# Patient Record
Sex: MALE | Race: WHITE | Employment: FULL TIME | URBAN - METROPOLITAN AREA
[De-identification: names, ages, dates, MRNs, and addresses within clinical notes are randomized per-mention and may not be internally consistent; named-entity substitution may affect disease eponyms.]

---

## 2022-04-14 ENCOUNTER — HOSPITAL ENCOUNTER (OUTPATIENT)
Dept: ULTRASOUND IMAGING | Age: 47
Discharge: HOME | End: 2022-04-16
Payer: COMMERCIAL

## 2022-04-14 DIAGNOSIS — R94.4 ABNORMAL KIDNEY FUNCTION STUDY: ICD-10-CM

## 2022-04-14 PROCEDURE — 76770 US EXAM ABDO BACK WALL COMP: CPT

## 2022-04-14 PROCEDURE — 93975 VASCULAR STUDY: CPT

## 2022-09-19 ENCOUNTER — HOSPITAL ENCOUNTER (OUTPATIENT)
Age: 47
Setting detail: OBSERVATION
Discharge: HOME | End: 2022-09-21
Attending: EMERGENCY MEDICINE
Payer: COMMERCIAL

## 2022-09-19 ENCOUNTER — APPOINTMENT (OUTPATIENT)
Dept: CT IMAGING | Age: 47
End: 2022-09-19
Payer: COMMERCIAL

## 2022-09-19 DIAGNOSIS — N20.0 KIDNEY STONE: ICD-10-CM

## 2022-09-19 DIAGNOSIS — N17.9 AKI (ACUTE KIDNEY INJURY) (HCC): Primary | ICD-10-CM

## 2022-09-19 DIAGNOSIS — N20.1 URETERAL CALCULI: ICD-10-CM

## 2022-09-19 PROBLEM — E11.9 DIABETES MELLITUS (HCC): Status: ACTIVE | Noted: 2022-09-19

## 2022-09-19 PROBLEM — E78.5 HLD (HYPERLIPIDEMIA): Status: ACTIVE | Noted: 2022-09-19

## 2022-09-19 PROBLEM — N13.2 HYDRONEPHROSIS WITH RENAL CALCULOUS OBSTRUCTION: Status: ACTIVE | Noted: 2022-09-19

## 2022-09-19 LAB
ANION GAP SERPL CALCULATED.3IONS-SCNC: 14 MMOL/L (ref 7–16)
BACTERIA: NORMAL /HPF
BASOPHILS ABSOLUTE: 0.04 E9/L (ref 0–0.2)
BASOPHILS RELATIVE PERCENT: 0.4 % (ref 0–2)
BILIRUBIN URINE: NEGATIVE
BLOOD, URINE: ABNORMAL
BUN BLDV-MCNC: 40 MG/DL (ref 6–20)
CALCIUM SERPL-MCNC: 10 MG/DL (ref 8.6–10.2)
CHLORIDE BLD-SCNC: 101 MMOL/L (ref 98–107)
CLARITY: ABNORMAL
CO2: 24 MMOL/L (ref 22–29)
COLOR: YELLOW
CREAT SERPL-MCNC: 3.1 MG/DL (ref 0.7–1.2)
EOSINOPHILS ABSOLUTE: 0.08 E9/L (ref 0.05–0.5)
EOSINOPHILS RELATIVE PERCENT: 0.9 % (ref 0–6)
EPITHELIAL CELLS, UA: NORMAL /HPF
GFR AFRICAN AMERICAN: 26
GFR NON-AFRICAN AMERICAN: 22 ML/MIN/1.73
GLUCOSE BLD-MCNC: 113 MG/DL (ref 74–99)
GLUCOSE URINE: >=1000 MG/DL
HCT VFR BLD CALC: 38.7 % (ref 37–54)
HEMOGLOBIN: 13.2 G/DL (ref 12.5–16.5)
IMMATURE GRANULOCYTES #: 0.03 E9/L
IMMATURE GRANULOCYTES %: 0.3 % (ref 0–5)
KETONES, URINE: 15 MG/DL
LEUKOCYTE ESTERASE, URINE: NEGATIVE
LYMPHOCYTES ABSOLUTE: 2.5 E9/L (ref 1.5–4)
LYMPHOCYTES RELATIVE PERCENT: 27.1 % (ref 20–42)
MCH RBC QN AUTO: 30.6 PG (ref 26–35)
MCHC RBC AUTO-ENTMCNC: 34.1 % (ref 32–34.5)
MCV RBC AUTO: 89.8 FL (ref 80–99.9)
MONOCYTES ABSOLUTE: 0.73 E9/L (ref 0.1–0.95)
MONOCYTES RELATIVE PERCENT: 7.9 % (ref 2–12)
NEUTROPHILS ABSOLUTE: 5.83 E9/L (ref 1.8–7.3)
NEUTROPHILS RELATIVE PERCENT: 63.4 % (ref 43–80)
NITRITE, URINE: NEGATIVE
PDW BLD-RTO: 12.4 FL (ref 11.5–15)
PH UA: 6 (ref 5–9)
PLATELET # BLD: 248 E9/L (ref 130–450)
PMV BLD AUTO: 9 FL (ref 7–12)
POTASSIUM SERPL-SCNC: 4.3 MMOL/L (ref 3.5–5)
PROTEIN UA: 30 MG/DL
RBC # BLD: 4.31 E12/L (ref 3.8–5.8)
RBC UA: >20 /HPF (ref 0–2)
SODIUM BLD-SCNC: 139 MMOL/L (ref 132–146)
SPECIFIC GRAVITY UA: 1.01 (ref 1–1.03)
UROBILINOGEN, URINE: 0.2 E.U./DL
WBC # BLD: 9.2 E9/L (ref 4.5–11.5)
WBC UA: NORMAL /HPF (ref 0–5)

## 2022-09-19 PROCEDURE — 87088 URINE BACTERIA CULTURE: CPT

## 2022-09-19 PROCEDURE — 74176 CT ABD & PELVIS W/O CONTRAST: CPT

## 2022-09-19 PROCEDURE — 96360 HYDRATION IV INFUSION INIT: CPT

## 2022-09-19 PROCEDURE — 80048 BASIC METABOLIC PNL TOTAL CA: CPT

## 2022-09-19 PROCEDURE — 2580000003 HC RX 258: Performed by: PHYSICIAN ASSISTANT

## 2022-09-19 PROCEDURE — 81001 URINALYSIS AUTO W/SCOPE: CPT

## 2022-09-19 PROCEDURE — G0378 HOSPITAL OBSERVATION PER HR: HCPCS

## 2022-09-19 PROCEDURE — 99285 EMERGENCY DEPT VISIT HI MDM: CPT

## 2022-09-19 PROCEDURE — 85025 COMPLETE CBC W/AUTO DIFF WBC: CPT

## 2022-09-19 PROCEDURE — 96361 HYDRATE IV INFUSION ADD-ON: CPT

## 2022-09-19 RX ORDER — SODIUM CHLORIDE 9 MG/ML
500 INJECTION, SOLUTION INTRAVENOUS CONTINUOUS
Status: DISCONTINUED | OUTPATIENT
Start: 2022-09-19 | End: 2022-09-21 | Stop reason: HOSPADM

## 2022-09-19 RX ORDER — 0.9 % SODIUM CHLORIDE 0.9 %
500 INTRAVENOUS SOLUTION INTRAVENOUS ONCE
Status: COMPLETED | OUTPATIENT
Start: 2022-09-19 | End: 2022-09-19

## 2022-09-19 RX ADMIN — SODIUM CHLORIDE 500 ML: 9 INJECTION, SOLUTION INTRAVENOUS at 20:53

## 2022-09-19 NOTE — ED NOTES
Department of Emergency Medicine  FIRST PROVIDER TRIAGE NOTE             Independent MLP           9/19/22  3:15 PM EDT    Date of Encounter: 9/19/22   MRN: [id number]      HPI: Danna Beach is a 52 y.o. male who presents to the ED for Hematuria (Started this am)       ROS: Negative for cp, sob, or back pain. PE: Gen Appearance/Constitutional: alert  HEENT: NC/NT. PERRLA,  Airway patent. Initial Plan of Care: All treatment areas with department are currently occupied. Plan to order/Initiate the following while awaiting opening in ED: labs.   Initiate Treatment-Testing, Proceed toTreatment Area When Bed Available for ED Attending/MLP to Continue Care    Electronically signed by SAMANTHA Altamirano   DD: 9/19/22       SAMANTHA Singh  09/19/22 [id number]

## 2022-09-20 ENCOUNTER — APPOINTMENT (OUTPATIENT)
Dept: GENERAL RADIOLOGY | Age: 47
End: 2022-09-20
Payer: COMMERCIAL

## 2022-09-20 ENCOUNTER — ANESTHESIA EVENT (OUTPATIENT)
Dept: OPERATING ROOM | Age: 47
End: 2022-09-20
Payer: COMMERCIAL

## 2022-09-20 ENCOUNTER — ANESTHESIA (OUTPATIENT)
Dept: OPERATING ROOM | Age: 47
End: 2022-09-20
Payer: COMMERCIAL

## 2022-09-20 LAB
ALBUMIN SERPL-MCNC: 3.9 G/DL (ref 3.5–5.2)
ALP BLD-CCNC: 50 U/L (ref 40–129)
ALT SERPL-CCNC: 14 U/L (ref 0–40)
ANION GAP SERPL CALCULATED.3IONS-SCNC: 11 MMOL/L (ref 7–16)
AST SERPL-CCNC: 21 U/L (ref 0–39)
BASOPHILS ABSOLUTE: 0.05 E9/L (ref 0–0.2)
BASOPHILS RELATIVE PERCENT: 0.7 % (ref 0–2)
BILIRUB SERPL-MCNC: 0.4 MG/DL (ref 0–1.2)
BUN BLDV-MCNC: 36 MG/DL (ref 6–20)
CALCIUM SERPL-MCNC: 9.4 MG/DL (ref 8.6–10.2)
CHLORIDE BLD-SCNC: 106 MMOL/L (ref 98–107)
CO2: 23 MMOL/L (ref 22–29)
CREAT SERPL-MCNC: 2.8 MG/DL (ref 0.7–1.2)
EOSINOPHILS ABSOLUTE: 0.12 E9/L (ref 0.05–0.5)
EOSINOPHILS RELATIVE PERCENT: 1.6 % (ref 0–6)
GFR AFRICAN AMERICAN: 30
GFR NON-AFRICAN AMERICAN: 24 ML/MIN/1.73
GLUCOSE BLD-MCNC: 63 MG/DL (ref 74–99)
HCT VFR BLD CALC: 37.9 % (ref 37–54)
HEMOGLOBIN: 12.9 G/DL (ref 12.5–16.5)
IMMATURE GRANULOCYTES #: 0.02 E9/L
IMMATURE GRANULOCYTES %: 0.3 % (ref 0–5)
LYMPHOCYTES ABSOLUTE: 2.26 E9/L (ref 1.5–4)
LYMPHOCYTES RELATIVE PERCENT: 30.3 % (ref 20–42)
MCH RBC QN AUTO: 30.8 PG (ref 26–35)
MCHC RBC AUTO-ENTMCNC: 34 % (ref 32–34.5)
MCV RBC AUTO: 90.5 FL (ref 80–99.9)
METER GLUCOSE: 162 MG/DL (ref 74–99)
METER GLUCOSE: 65 MG/DL (ref 74–99)
METER GLUCOSE: 67 MG/DL (ref 74–99)
METER GLUCOSE: 79 MG/DL (ref 74–99)
METER GLUCOSE: 85 MG/DL (ref 74–99)
METER GLUCOSE: 99 MG/DL (ref 74–99)
MONOCYTES ABSOLUTE: 0.69 E9/L (ref 0.1–0.95)
MONOCYTES RELATIVE PERCENT: 9.3 % (ref 2–12)
NEUTROPHILS ABSOLUTE: 4.31 E9/L (ref 1.8–7.3)
NEUTROPHILS RELATIVE PERCENT: 57.8 % (ref 43–80)
PDW BLD-RTO: 12.5 FL (ref 11.5–15)
PLATELET # BLD: 229 E9/L (ref 130–450)
PMV BLD AUTO: 9.3 FL (ref 7–12)
POTASSIUM REFLEX MAGNESIUM: 4.4 MMOL/L (ref 3.5–5)
RBC # BLD: 4.19 E12/L (ref 3.8–5.8)
SODIUM BLD-SCNC: 140 MMOL/L (ref 132–146)
TOTAL PROTEIN: 6.9 G/DL (ref 6.4–8.3)
WBC # BLD: 7.5 E9/L (ref 4.5–11.5)

## 2022-09-20 PROCEDURE — 6370000000 HC RX 637 (ALT 250 FOR IP): Performed by: NURSE PRACTITIONER

## 2022-09-20 PROCEDURE — 6360000002 HC RX W HCPCS: Performed by: NURSE PRACTITIONER

## 2022-09-20 PROCEDURE — 36415 COLL VENOUS BLD VENIPUNCTURE: CPT

## 2022-09-20 PROCEDURE — 2709999900 HC NON-CHARGEABLE SUPPLY: Performed by: UROLOGY

## 2022-09-20 PROCEDURE — G0378 HOSPITAL OBSERVATION PER HR: HCPCS

## 2022-09-20 PROCEDURE — C1758 CATHETER, URETERAL: HCPCS | Performed by: UROLOGY

## 2022-09-20 PROCEDURE — 3700000001 HC ADD 15 MINUTES (ANESTHESIA): Performed by: UROLOGY

## 2022-09-20 PROCEDURE — 82962 GLUCOSE BLOOD TEST: CPT

## 2022-09-20 PROCEDURE — 3600000003 HC SURGERY LEVEL 3 BASE: Performed by: UROLOGY

## 2022-09-20 PROCEDURE — 2580000003 HC RX 258: Performed by: NURSE PRACTITIONER

## 2022-09-20 PROCEDURE — 2500000003 HC RX 250 WO HCPCS: Performed by: NURSE ANESTHETIST, CERTIFIED REGISTERED

## 2022-09-20 PROCEDURE — 6360000002 HC RX W HCPCS: Performed by: NURSE ANESTHETIST, CERTIFIED REGISTERED

## 2022-09-20 PROCEDURE — 85025 COMPLETE CBC W/AUTO DIFF WBC: CPT

## 2022-09-20 PROCEDURE — 80053 COMPREHEN METABOLIC PANEL: CPT

## 2022-09-20 PROCEDURE — 6370000000 HC RX 637 (ALT 250 FOR IP): Performed by: UROLOGY

## 2022-09-20 PROCEDURE — 3700000000 HC ANESTHESIA ATTENDED CARE: Performed by: UROLOGY

## 2022-09-20 PROCEDURE — 96361 HYDRATE IV INFUSION ADD-ON: CPT

## 2022-09-20 PROCEDURE — 74420 UROGRAPHY RTRGR +-KUB: CPT

## 2022-09-20 PROCEDURE — 7100000011 HC PHASE II RECOVERY - ADDTL 15 MIN: Performed by: UROLOGY

## 2022-09-20 PROCEDURE — C1769 GUIDE WIRE: HCPCS | Performed by: UROLOGY

## 2022-09-20 PROCEDURE — 7100000010 HC PHASE II RECOVERY - FIRST 15 MIN: Performed by: UROLOGY

## 2022-09-20 PROCEDURE — C2617 STENT, NON-COR, TEM W/O DEL: HCPCS | Performed by: UROLOGY

## 2022-09-20 PROCEDURE — 6360000004 HC RX CONTRAST MEDICATION: Performed by: UROLOGY

## 2022-09-20 PROCEDURE — 87088 URINE BACTERIA CULTURE: CPT

## 2022-09-20 PROCEDURE — 2580000003 HC RX 258: Performed by: UROLOGY

## 2022-09-20 PROCEDURE — 3600000013 HC SURGERY LEVEL 3 ADDTL 15MIN: Performed by: UROLOGY

## 2022-09-20 PROCEDURE — 2580000003 HC RX 258: Performed by: EMERGENCY MEDICINE

## 2022-09-20 DEVICE — URETERAL STENT
Type: IMPLANTABLE DEVICE | Site: URETER | Status: FUNCTIONAL
Brand: PERCUFLEX™

## 2022-09-20 RX ORDER — PRAVASTATIN SODIUM 20 MG
20 TABLET ORAL NIGHTLY
Status: DISCONTINUED | OUTPATIENT
Start: 2022-09-20 | End: 2022-09-21 | Stop reason: HOSPADM

## 2022-09-20 RX ORDER — SODIUM CHLORIDE, SODIUM LACTATE, POTASSIUM CHLORIDE, CALCIUM CHLORIDE 600; 310; 30; 20 MG/100ML; MG/100ML; MG/100ML; MG/100ML
INJECTION, SOLUTION INTRAVENOUS CONTINUOUS
Status: DISCONTINUED | OUTPATIENT
Start: 2022-09-20 | End: 2022-09-21 | Stop reason: HOSPADM

## 2022-09-20 RX ORDER — PROPOFOL 10 MG/ML
INJECTION, EMULSION INTRAVENOUS PRN
Status: DISCONTINUED | OUTPATIENT
Start: 2022-09-20 | End: 2022-09-20 | Stop reason: SDUPTHER

## 2022-09-20 RX ORDER — INSULIN GLARGINE 100 [IU]/ML
30 INJECTION, SOLUTION SUBCUTANEOUS NIGHTLY
Status: DISCONTINUED | OUTPATIENT
Start: 2022-09-20 | End: 2022-09-21 | Stop reason: HOSPADM

## 2022-09-20 RX ORDER — MORPHINE SULFATE 2 MG/ML
1 INJECTION, SOLUTION INTRAMUSCULAR; INTRAVENOUS EVERY 6 HOURS PRN
Status: DISCONTINUED | OUTPATIENT
Start: 2022-09-20 | End: 2022-09-21 | Stop reason: HOSPADM

## 2022-09-20 RX ORDER — METOPROLOL SUCCINATE 50 MG/1
50 TABLET, EXTENDED RELEASE ORAL DAILY
COMMUNITY

## 2022-09-20 RX ORDER — ROSUVASTATIN CALCIUM 10 MG/1
10 TABLET, COATED ORAL DAILY
COMMUNITY

## 2022-09-20 RX ORDER — INSULIN LISPRO 100 [IU]/ML
0-4 INJECTION, SOLUTION INTRAVENOUS; SUBCUTANEOUS
Status: DISCONTINUED | OUTPATIENT
Start: 2022-09-20 | End: 2022-09-21 | Stop reason: HOSPADM

## 2022-09-20 RX ORDER — MORPHINE SULFATE 2 MG/ML
2 INJECTION, SOLUTION INTRAMUSCULAR; INTRAVENOUS EVERY 6 HOURS PRN
Status: DISCONTINUED | OUTPATIENT
Start: 2022-09-20 | End: 2022-09-21 | Stop reason: HOSPADM

## 2022-09-20 RX ORDER — DEXTROSE MONOHYDRATE 100 MG/ML
INJECTION, SOLUTION INTRAVENOUS CONTINUOUS PRN
Status: DISCONTINUED | OUTPATIENT
Start: 2022-09-20 | End: 2022-09-21 | Stop reason: HOSPADM

## 2022-09-20 RX ORDER — ESMOLOL HYDROCHLORIDE 10 MG/ML
INJECTION INTRAVENOUS PRN
Status: DISCONTINUED | OUTPATIENT
Start: 2022-09-20 | End: 2022-09-20 | Stop reason: SDUPTHER

## 2022-09-20 RX ORDER — SODIUM CHLORIDE 9 MG/ML
INJECTION, SOLUTION INTRAVENOUS PRN
Status: DISCONTINUED | OUTPATIENT
Start: 2022-09-20 | End: 2022-09-21 | Stop reason: HOSPADM

## 2022-09-20 RX ORDER — SODIUM CHLORIDE 0.9 % (FLUSH) 0.9 %
5-40 SYRINGE (ML) INJECTION PRN
Status: DISCONTINUED | OUTPATIENT
Start: 2022-09-20 | End: 2022-09-21 | Stop reason: HOSPADM

## 2022-09-20 RX ORDER — MIDAZOLAM HYDROCHLORIDE 1 MG/ML
INJECTION INTRAMUSCULAR; INTRAVENOUS PRN
Status: DISCONTINUED | OUTPATIENT
Start: 2022-09-20 | End: 2022-09-20 | Stop reason: SDUPTHER

## 2022-09-20 RX ORDER — FENTANYL CITRATE 50 UG/ML
INJECTION, SOLUTION INTRAMUSCULAR; INTRAVENOUS PRN
Status: DISCONTINUED | OUTPATIENT
Start: 2022-09-20 | End: 2022-09-20 | Stop reason: SDUPTHER

## 2022-09-20 RX ORDER — SODIUM CHLORIDE 0.9 % (FLUSH) 0.9 %
5-40 SYRINGE (ML) INJECTION EVERY 12 HOURS SCHEDULED
Status: DISCONTINUED | OUTPATIENT
Start: 2022-09-20 | End: 2022-09-21 | Stop reason: HOSPADM

## 2022-09-20 RX ORDER — INSULIN LISPRO 100 [IU]/ML
0-4 INJECTION, SOLUTION INTRAVENOUS; SUBCUTANEOUS NIGHTLY
Status: DISCONTINUED | OUTPATIENT
Start: 2022-09-20 | End: 2022-09-21 | Stop reason: HOSPADM

## 2022-09-20 RX ORDER — LIDOCAINE HYDROCHLORIDE 20 MG/ML
INJECTION, SOLUTION EPIDURAL; INFILTRATION; INTRACAUDAL; PERINEURAL PRN
Status: DISCONTINUED | OUTPATIENT
Start: 2022-09-20 | End: 2022-09-20 | Stop reason: SDUPTHER

## 2022-09-20 RX ORDER — OXYCODONE HYDROCHLORIDE AND ACETAMINOPHEN 5; 325 MG/1; MG/1
1 TABLET ORAL EVERY 4 HOURS PRN
Status: DISCONTINUED | OUTPATIENT
Start: 2022-09-20 | End: 2022-09-21 | Stop reason: HOSPADM

## 2022-09-20 RX ORDER — BISACODYL 10 MG
10 SUPPOSITORY, RECTAL RECTAL DAILY PRN
Status: DISCONTINUED | OUTPATIENT
Start: 2022-09-20 | End: 2022-09-21 | Stop reason: HOSPADM

## 2022-09-20 RX ORDER — PROPOFOL 10 MG/ML
INJECTION, EMULSION INTRAVENOUS CONTINUOUS PRN
Status: DISCONTINUED | OUTPATIENT
Start: 2022-09-20 | End: 2022-09-20 | Stop reason: SDUPTHER

## 2022-09-20 RX ORDER — AMLODIPINE BESYLATE 10 MG/1
10 TABLET ORAL DAILY
COMMUNITY

## 2022-09-20 RX ADMIN — SODIUM CHLORIDE, POTASSIUM CHLORIDE, SODIUM LACTATE AND CALCIUM CHLORIDE: 600; 310; 30; 20 INJECTION, SOLUTION INTRAVENOUS at 23:46

## 2022-09-20 RX ADMIN — PRAVASTATIN SODIUM 20 MG: 20 TABLET ORAL at 19:58

## 2022-09-20 RX ADMIN — PROPOFOL 70 MG: 10 INJECTION, EMULSION INTRAVENOUS at 13:27

## 2022-09-20 RX ADMIN — SODIUM CHLORIDE 500 ML: 9 INJECTION, SOLUTION INTRAVENOUS at 06:31

## 2022-09-20 RX ADMIN — OXYCODONE HYDROCHLORIDE AND ACETAMINOPHEN 1 TABLET: 5; 325 TABLET ORAL at 19:58

## 2022-09-20 RX ADMIN — FENTANYL CITRATE 50 MCG: 50 INJECTION, SOLUTION INTRAMUSCULAR; INTRAVENOUS at 13:43

## 2022-09-20 RX ADMIN — WATER 2000 MG: 1 INJECTION INTRAMUSCULAR; INTRAVENOUS; SUBCUTANEOUS at 13:26

## 2022-09-20 RX ADMIN — LIDOCAINE HYDROCHLORIDE 40 MG: 20 INJECTION, SOLUTION EPIDURAL; INFILTRATION; INTRACAUDAL; PERINEURAL at 13:27

## 2022-09-20 RX ADMIN — PROPOFOL 150 MCG/KG/MIN: 10 INJECTION, EMULSION INTRAVENOUS at 13:20

## 2022-09-20 RX ADMIN — INSULIN GLARGINE 30 UNITS: 100 INJECTION, SOLUTION SUBCUTANEOUS at 20:03

## 2022-09-20 RX ADMIN — FENTANYL CITRATE 50 MCG: 50 INJECTION, SOLUTION INTRAMUSCULAR; INTRAVENOUS at 13:35

## 2022-09-20 RX ADMIN — DEXTROSE MONOHYDRATE 250 ML: 10 INJECTION, SOLUTION INTRAVENOUS at 06:46

## 2022-09-20 RX ADMIN — MIDAZOLAM 2 MG: 1 INJECTION INTRAMUSCULAR; INTRAVENOUS at 13:20

## 2022-09-20 RX ADMIN — SODIUM CHLORIDE: 9 INJECTION, SOLUTION INTRAVENOUS at 12:50

## 2022-09-20 RX ADMIN — FENTANYL CITRATE 50 MCG: 50 INJECTION, SOLUTION INTRAMUSCULAR; INTRAVENOUS at 13:27

## 2022-09-20 RX ADMIN — SODIUM CHLORIDE, POTASSIUM CHLORIDE, SODIUM LACTATE AND CALCIUM CHLORIDE: 600; 310; 30; 20 INJECTION, SOLUTION INTRAVENOUS at 15:49

## 2022-09-20 RX ADMIN — ESMOLOL HYDROCHLORIDE 10 MG: 10 INJECTION, SOLUTION INTRAVENOUS at 13:47

## 2022-09-20 RX ADMIN — ESMOLOL HYDROCHLORIDE 20 MG: 10 INJECTION, SOLUTION INTRAVENOUS at 13:50

## 2022-09-20 RX ADMIN — FENTANYL CITRATE 50 MCG: 50 INJECTION, SOLUTION INTRAMUSCULAR; INTRAVENOUS at 13:31

## 2022-09-20 RX ADMIN — ESMOLOL HYDROCHLORIDE 10 MG: 10 INJECTION, SOLUTION INTRAVENOUS at 13:45

## 2022-09-20 RX ADMIN — OXYCODONE HYDROCHLORIDE AND ACETAMINOPHEN 1 TABLET: 5; 325 TABLET ORAL at 15:49

## 2022-09-20 RX ADMIN — OXYCODONE HYDROCHLORIDE AND ACETAMINOPHEN 1 TABLET: 5; 325 TABLET ORAL at 23:47

## 2022-09-20 ASSESSMENT — LIFESTYLE VARIABLES: SMOKING_STATUS: 0

## 2022-09-20 ASSESSMENT — PAIN SCALES - GENERAL
PAINLEVEL_OUTOF10: 0
PAINLEVEL_OUTOF10: 0
PAINLEVEL_OUTOF10: 5
PAINLEVEL_OUTOF10: 0
PAINLEVEL_OUTOF10: 4
PAINLEVEL_OUTOF10: 3
PAINLEVEL_OUTOF10: 0
PAINLEVEL_OUTOF10: 0

## 2022-09-20 ASSESSMENT — PAIN - FUNCTIONAL ASSESSMENT
PAIN_FUNCTIONAL_ASSESSMENT: ACTIVITIES ARE NOT PREVENTED
PAIN_FUNCTIONAL_ASSESSMENT: 0-10
PAIN_FUNCTIONAL_ASSESSMENT: ACTIVITIES ARE NOT PREVENTED

## 2022-09-20 ASSESSMENT — PAIN DESCRIPTION - DESCRIPTORS
DESCRIPTORS: STABBING
DESCRIPTORS: DISCOMFORT;STABBING
DESCRIPTORS: ACHING;DISCOMFORT;SHARP

## 2022-09-20 ASSESSMENT — PAIN DESCRIPTION - LOCATION
LOCATION: GROIN

## 2022-09-20 NOTE — CONSULTS
9/20/2022 8:39 AM  Shira Shane  [id number]     Chief Complaint:    6mm right mid ureteral calculi      History of Present Illness: The patient is a 52 y.o. male patient who presented to the hospital with complaints of gross hematuria that began yesterday morning. CT abdomen pelvis in the ER revealed a 6mm right mid ureteral calculi with right hydronephrosis. He does have a history of kidney stones. He has never required intervention for these and has been able to pass them on his own in the past. He denies any fever or chills. No flank pain. Past Medical History:   Diagnosis Date    Back pain     Diabetes mellitus ([facility] [address]     Kidney stone          History reviewed. No pertinent surgical history. Medications Prior to Admission:    Medications Prior to Admission: metoprolol succinate (TOPROL XL) 50 MG extended release tablet, Take 50 mg by mouth daily  amLODIPine (NORVASC) 10 MG tablet, Take 10 mg by mouth daily  rosuvastatin (CRESTOR) 10 MG tablet, Take 10 mg by mouth daily  ibuprofen (ADVIL;MOTRIN) 800 MG tablet, Take 1 tablet by mouth every 6 hours as needed for Pain  tamsulosin (FLOMAX) 0.4 MG capsule, Take 1 capsule by mouth daily for 5 days. insulin glargine (LANTUS) 100 UNIT/ML injection vial, Inject 15 Units into the skin every morning  metFORMIN (GLUCOPHAGE) 500 MG tablet, Take 1,000 mg by mouth 2 times daily (with meals). (Patient not taking: Reported on 9/20/2022)  losartan (COZAAR) 25 MG tablet, Take 25 mg by mouth daily. (Patient not taking: Reported on 9/20/2022)  Canagliflozin (INVOKANA PO), Take  by mouth. (Patient not taking: Reported on 9/20/2022)  glimepiride (AMARYL) 2 MG tablet, Take 4 mg by mouth every morning (before breakfast)  pravastatin (PRAVACHOL) 20 MG tablet, Take 20 mg by mouth daily. (Patient not taking: Reported on 9/20/2022)  tamsulosin (FLOMAX) 0.4 MG capsule, Take 1 capsule by mouth daily for 7 days. Allergies:    Patient has no known allergies.     Social History:    reports current alcohol use. He reports that he does not use drugs. Family History:   Non-contributory to this Urological problem  family history is not on file. Review of Systems:  Constitutional: No fever or chills   Respiratory: negative for cough and hemoptysis  Cardiovascular: negative for chest pain and dyspnea  Gastrointestinal: negative for abdominal pain, diarrhea, nausea and vomiting   Derm: negative for rash and skin lesion(s)  Neurological: negative for seizures and tremors  Musculoskeletal: Negative    Psychiatric: Negative   : As above in the HPI, otherwise negative  All other reviews are negative    Physical Exam:     Vitals:  /69   Pulse 60   Temp 98.1 °F (36.7 °C) (Oral)   Resp 16   Ht 5' 8\" (1.727 m)   Wt 190 lb (86.2 kg)   SpO2 98%   BMI 28.89 kg/m²     General:  Awake, alert, oriented X 3. No apparent distress. HEENT:  Normocephalic, atraumatic. Lungs:  Respirations symmetric and non-labored. Abdomen:  soft, nontender, no masses  Extremities:  No clubbing, cyanosis, or edema  Skin:  Warm and dry, no open lesions or rashes  Neuro: There are no motor or sensory deficits in the 4 quadrant extremities   Rectal: deferred  Genitourinary:  no catalan     Labs:     Recent Labs     09/19/22 1957 09/20/22  0630   WBC 9.2 7.5   RBC 4.31 4.19   HGB 13.2 12.9   HCT 38.7 37.9   MCV 89.8 90.5   MCH 30.6 30.8   MCHC 34.1 34.0   RDW 12.4 12.5    229   MPV 9.0 9.3         Recent Labs     09/19/22  0811 09/19/22 1957 09/20/22  0630   CREATININE 3.1* 3.1* 2.8*       No results found for: PSA    Imaging:   Narrative   EXAMINATION:   CT OF THE ABDOMEN AND PELVIS WITHOUT CONTRAST 9/19/2022 9:01 pm       TECHNIQUE:   CT of the abdomen and pelvis was performed without the administration of   intravenous contrast. Multiplanar reformatted images are provided for review.    Automated exposure control, iterative reconstruction, and/or weight based   adjustment of the mA/kV was utilized to reduce the radiation dose to as low   as reasonably achievable. COMPARISON:   None. HISTORY:   ORDERING SYSTEM PROVIDED HISTORY: hematuria, creatinine doubled   TECHNOLOGIST PROVIDED HISTORY:   Reason for exam:->hematuria, creatinine doubled   Additional Contrast?->None   Decision Support Exception - unselect if not a suspected or confirmed   emergency medical condition->Emergency Medical Condition (MA)       FINDINGS:   Lower Chest: Visualized lungs are normal.       Organs: The liver, spleen, adrenal glands, pancreas and gallbladder are   normal.  Is 6 mm obstructing calculi right mid ureter with moderate to severe   right hydronephrosis. Bilateral punctate nonobstructing renal calculi. GI/bowel: Normal large and small bowel. Normal appendix. Pelvis: Normal urinary bladder. Peritoneum/Retroperitoneum: No free fluid or free air. Bones/Soft Tissues: Unremarkable. Impression   6 mm obstructing calculus right mid ureter with moderate to severe right   hydronephrosis. Punctate bilateral nonobstructing renal calculi.                        Assessment/plan:  6mm right mid ureteral stone  Right hydronephrosis   MIKKI on CKD     Cont to watch the creatinine, follows with Dr. Hailee Stoner as well  UA reviewed  Urine culture pending  CTAP reviewed, 6mm right mid ureteral calculi with right hydronephrosis   Keep NPO  Strain urine  Treatment consent for cystoscopy, retrograde pyelogram, ureteroscopy, laser lithotripsy, right stent insertion  He was made aware that if there is any signs of infection then only a stent will be placed today and he would require second procedure for definitive stone management   He consented to the above   Ancef on call to OR      Electronically signed by ANA Connor CNP on 9/20/2022 at 8:39 AM  [facility]   Agree with above assessment and plan  Will do cysto today

## 2022-09-20 NOTE — PATIENT CARE CONFERENCE
P Quality Flow/Interdisciplinary Rounds Progress Note        Quality Flow Rounds held on September 20, 2022    Disciplines Attending:  Bedside Nurse, , , and Nursing Unit Leadership    Sierra Thomas was admitted on 9/19/2022  7:25 PM    Anticipated Discharge Date:       Disposition:    Roger Score:  Roger Scale Score: 22    Readmission Risk              Risk of Unplanned Readmission:  10           Discussed patient goal for the day, patient clinical progression, and barriers to discharge.   The following Goal(s) of the Day/Commitment(s) have been identified:  Diagnostics - Report Results      Perez Hayes RN  September 20, 2022

## 2022-09-20 NOTE — CARE COORDINATION
Introduced my self and provided explanation of CM role to patient. Patient is awake, alert, and aware of current diagnosis and treatment plan including urology consult with orders for pyelogram.  He voices he resides at home with his spouse and completes his adl's with independence. Patient verbalizes no concerns and identifies no areas to focus on nor barriers to discharge. Patient is established with a pcp and denies any issue with retail pharmaceutical coverage. He is hopeful for discharge in 24 hrs or less. Will follow along with  and assist with discharge planning as necessary. Carmen Andrade, MSN RN  [facility] Case Management  [phone]

## 2022-09-20 NOTE — DISCHARGE INSTRUCTIONS
A ureteral stent was inserted during your recent procedure. Unlike a heart \"stent\" which is metal, short, and permanent, this ureteral stent plastic, and temporary. It spans from your kidney, down the ureter, and into your bladder. This will need to be removed, so it is very important that you follow-up with your doctor. IMPORTANT - This ureteral stent will likely cause you to experience frequent urination, urgency to urinate, back/flank pain with urination, and/or blood in the urine. These things are very normal.  Taking the pain medications and/or anti-inflammatories will help to manage this discomfort if present. If you have any questions or concerns you can contact [facility] office at [phone]. Ureteral Stent Placement: What to Expect at [address] [facility]     A ureteral (say \"you-REE-ter-ul\") stent is a thin, hollow tube that is placed in the ureter to help urine pass from the kidney into the bladder. Ureters are the tubes that connect the kidneys to the bladder. You may have a small amount of blood in your urine for 1 to 3 days after the procedure. While the stent is in place, you may have to urinate more often, feel a sudden need to urinate, or feel like you can't completely empty your bladder. You may feel some pain when you urinate or do strenuous activity. You also may notice a small amount of blood in your urine after strenuous activities. These side effects usually don't prevent people from doing their normal daily activities. You may have a thin string coming out of your urethra. Your urethra is the tube that carries urine from your bladder to outside your body. This string is attached to the stent. Try not to pull on the string. It will be used to pull out the stent when you no longer need it. After the procedure, urine may flow better from your kidneys to your bladder. A ureteral stent may be left in place for several days or for as long as several months.  Your doctor will them as directed. Do not stop taking them just because you feel better. You need to take the full course of antibiotics. Follow-up care is a key part of your treatment and safety. Be sure to make and go to all appointments, and call your doctor if you are having problems. It's also a good idea to know your test results and keep a list of the medicines you take. When should you call for help? Call 911 anytime you think you may need emergency care. For example, call if:    You passed out (lost consciousness). You have severe trouble breathing. You have sudden chest pain and shortness of breath, or you cough up blood. You have severe belly pain. Call your doctor now or seek immediate medical care if:    Part or all of the stent comes out of your urethra. You have pain that does not get better after you take pain medicine. You have symptoms of a urinary infection. For example: You have blood or pus in your urine. You have pain in your back just below your rib cage. This is called flank pain. You have a fever, chills, or body aches. It hurts to urinate. You have groin or belly pain. You cannot control when you urinate, or you leak urine. Watch closely for changes in your health, and be sure to contact your doctor if you have any problems. Where can you learn more? Go to [email or web address] and sign in to your [employer] account. Enter J606 in the [facility] box to learn more about \"Ureteral Stent Placement: What to Expect at Home. \"     If you do not have an account, please click on the \"Sign Up Now\" link. Current as of: February 10, 2021               Content Version: 12.9  © 1615-7838 Healthwise, Incorporated. Care instructions adapted under license by [facility] [employer] [facility] ([facility]).  If you have questions about a medical condition or this instruction, always ask your healthcare professional. [place] [address] any warranty or liability for your use of this information.

## 2022-09-20 NOTE — H&P
Hospital Medicine History & Physical      PCP: Juliet David MD    Date of Admission: 9/19/2022    Date of Service: . SEPT 20. 2022    Chief Complaint: RIGHT FLANK PAIN      History Of Present Illness:     52 y.o. male presented with RIGHT FLANK PAIN, HAD A KNOWN HISTORY OF KIDNEY STONES,  AND USUSALLY PASSES THEM, HAD A RENAL US ON YESTERDAY, AND WENT HOME AND BEGAN TO URINATE BLOOD. IN PRE OP, PREPARING FOR SURGERY    Past Medical History:          Diagnosis Date    Back pain     Diabetes mellitus ([address]     Kidney stone        Past Surgical History:      History reviewed. No pertinent surgical history. Medications Prior to Admission:      Prior to Admission medications    Medication Sig Start Date End Date Taking? Authorizing Provider   metoprolol succinate (TOPROL XL) 50 MG extended release tablet Take 50 mg by mouth daily   Yes Historical Provider, MD   amLODIPine (NORVASC) 10 MG tablet Take 10 mg by mouth daily   Yes Historical Provider, MD   rosuvastatin (CRESTOR) 10 MG tablet Take 10 mg by mouth daily   Yes Historical Provider, MD   ibuprofen (ADVIL;MOTRIN) 800 MG tablet Take 1 tablet by mouth every 6 hours as needed for Pain 4/22/17 4/27/17  SAMANTHA Moser   tamsulosin (FLOMAX) 0.4 MG capsule Take 1 capsule by mouth daily for 5 days. 8/26/14 8/31/14  SAMANTHA Roberts   insulin glargine (LANTUS) 100 UNIT/ML injection vial Inject 15 Units into the skin every morning    Historical Provider, MD   metFORMIN (GLUCOPHAGE) 500 MG tablet Take 1,000 mg by mouth 2 times daily (with meals). Patient not taking: Reported on 9/20/2022    Historical Provider, MD   losartan (COZAAR) 25 MG tablet Take 25 mg by mouth daily. Patient not taking: Reported on 9/20/2022    Historical Provider, MD   Canagliflozin (INVOKANA PO) Take  by mouth.   Patient not taking: Reported on 9/20/2022 Historical Provider, MD   glimepiride (AMARYL) 2 MG tablet Take 4 mg by mouth every morning (before breakfast)    Historical Provider, MD   pravastatin (PRAVACHOL) 20 MG tablet Take 20 mg by mouth daily. Patient not taking: Reported on 2022    Historical Provider, MD   tamsulosin (FLOMAX) 0.4 MG capsule Take 1 capsule by mouth daily for 7 days. 11  Lucrecia Score, PA-C       Allergies:  Patient has no known allergies. Social History:      The patient currently lives WITH WIFE    TOBACCO:   has no history on file for tobacco use. ETOH:   reports current alcohol use. Family History:    MOM  OF PANCREATIC CANCERREVIEW OF SYSTEMS:   Pertinent positives as noted in the HPI. All other systems reviewed and negative. PHYSICAL EXAM:    BP (!) 110/57   Pulse 98   Temp 97.4 °F (36.3 °C) (Temporal)   Resp 16   Ht 5' 8\" (1.727 m)   Wt 190 lb (86.2 kg)   SpO2 100%   BMI 28.89 kg/m²     General appearance:  No apparent distress, appears stated age and cooperative. HEENT:  Normal cephalic, atraumatic without obvious deformity. Pupils equal, round, and reactive to light. Extra ocular muscles intact. Conjunctivae/corneas clear. Neck: Supple, with full range of motion. No jugular venous distention. Trachea midline. Respiratory:  Normal respiratory effort. Clear to auscultation, bilaterally without Rales/Wheezes/Rhonchi. Cardiovascular:  Regular rate and rhythm   Abdomen: Soft, non-tender, non-distended with normal bowel sounds. Musculoskeletal:  No clubbing, cyanosis or edema bilaterally. Skin: Skin color, texture, turgor normal.  No rashes or lesions. Neurologic:  Neurovascularly intact without any focal sensory/motor deficits.  Cranial nerves: II-XII intact, grossly non-focal.  Psychiatric:  Alert and oriented, thought content appropriate, normal insight        Labs:     Recent Labs     22  0630   WBC 9.2 7.5   HGB 13.2 12.9   HCT 38.7 37.9    229 Recent Labs     09/19/22  0811 09/19/22 1957 09/20/22  0630    139 140   K 4.7 4.3 4.4    101 106   CO2 23 24 23   BUN 37* 40* 36*   CREATININE 3.1* 3.1* 2.8*   CALCIUM 9.6 10.0 9.4   PHOS 4.3  --   --      Recent Labs     09/19/22  0811 09/20/22  0630   AST 20 21   ALT 15 14   BILITOT 0.4 0.4   ALKPHOS 53 50     No results for input(s): INR in the last 72 hours. No results for input(s): Amy Risk in the last 72 hours. Urinalysis:      Lab Results   Component Value Date/Time    NITRU Negative 09/19/2022 07:57 PM    [address] 09/19/2022 07:57 PM    WBCUA PACKED 12/17/2011 05:30 PM    BACTERIA NONE SEEN 09/19/2022 07:57 PM    RBCUA >20 09/19/2022 07:57 PM    RBCUA PACKED 12/17/2011 05:30 PM    BLOODU LARGE 09/19/2022 07:57 PM    SPECGRAV 1.015 09/19/2022 07:57 PM    GLUCOSEU >=1000 09/19/2022 07:57 PM    GLUCOSEU 100 12/17/2011 05:30 PM       Radiology:     CT ABDOMEN PELVIS WO CONTRAST Additional Contrast? None   Final Result   6 mm obstructing calculus right mid ureter with moderate to severe right   hydronephrosis. Punctate bilateral nonobstructing renal calculi. FL RETROGRADE PYELOGRAM W WO KUB    (Results Pending)       ASSESSMENT:    Active Hospital Problems    Diagnosis Date Noted    MIKKI (acute kidney injury) ([facility] [address] [N17.9] 09/19/2022     Priority: Medium    Hydronephrosis with renal calculous obstruction [N13.2] 09/19/2022     Priority: Medium    HLD (hyperlipidemia) [E78.5] 09/19/2022     Priority: Medium    Diabetes mellitus ([facility] [address] [E11.9] 09/19/2022     Priority: Medium         PLAN:  STAT OR  SSI  PRAVACHOL      DVT Prophylaxis: SCD  Diet: Diet NPO  Code Status: Full Code    PT/OT Eval Status: NA    Dispo -HOME    Electronically signed by Santa Coker DO on 9/20/2022 at 2:31 PM [facility]       Thank you Jocelyne Collins MD for the opportunity to be involved in this patient's care.  If you have any questions or concerns please feel free to contact me at [phone]

## 2022-09-20 NOTE — ANESTHESIA POSTPROCEDURE EVALUATION
Department of Anesthesiology  Postprocedure Note    Patient: Sabino Sutton  MRN: [id number]  [place]: 1975  Date of evaluation: 9/20/2022      Procedure Summary     Date: 09/20/22 Room / Location: [facility] OR 06 / [facility]    Anesthesia Start: 36 Anesthesia Stop:     Procedure: CYSTOSCOPY RETROGRADE PYELOGRAM URETEROSCOPY J STENT RIGHT (Right: Ureter) Diagnosis:       Ureteral calculi      (Ureteral calculi [N20.1])    Surgeons: Jun Oviedo MD Responsible Provider: Jenise Meza MD    Anesthesia Type: MAC ASA Status: 3          Anesthesia Type: No value filed.     Nader Phase I:      Nader Phase II:        Anesthesia Post Evaluation    Patient location during evaluation: PACU  Patient participation: complete - patient participated  Level of consciousness: awake  Airway patency: patent  Nausea & Vomiting: no nausea and no vomiting  Complications: no  Cardiovascular status: hemodynamically stable  Respiratory status: acceptable  Hydration status: euvolemic

## 2022-09-20 NOTE — ANESTHESIA PRE PROCEDURE
Department of Anesthesiology  Preprocedure Note       Name:  Maye Deshpande   Age:  52 y.o.  :  1975                                          MRN:  [id number]         Date:  2022      Surgeon: Cristina Quezada):  Nikki Macias MD    Procedure: Procedure(s):  CYSTOSCOPY RETROGRADE PYELOGRAM URETEROSCOPY J STENT LASER LITHOTRIPSY RIGHT    Medications prior to admission:   Prior to Admission medications    Medication Sig Start Date End Date Taking? Authorizing Provider   metoprolol succinate (TOPROL XL) 50 MG extended release tablet Take 50 mg by mouth daily   Yes Historical Provider, MD   amLODIPine (NORVASC) 10 MG tablet Take 10 mg by mouth daily   Yes Historical Provider, MD   rosuvastatin (CRESTOR) 10 MG tablet Take 10 mg by mouth daily   Yes Historical Provider, MD   ibuprofen (ADVIL;MOTRIN) 800 MG tablet Take 1 tablet by mouth every 6 hours as needed for Pain 17  SAMANTHA Fried   tamsulosin (FLOMAX) 0.4 MG capsule Take 1 capsule by mouth daily for 5 days. 14  SAMANTHA Godinez   insulin glargine (LANTUS) 100 UNIT/ML injection vial Inject 15 Units into the skin every morning    Historical Provider, MD   metFORMIN (GLUCOPHAGE) 500 MG tablet Take 1,000 mg by mouth 2 times daily (with meals). Patient not taking: Reported on 2022    Historical Provider, MD   losartan (COZAAR) 25 MG tablet Take 25 mg by mouth daily. Patient not taking: Reported on 2022    Historical Provider, MD   Canagliflozin (INVOKANA PO) Take  by mouth. Patient not taking: Reported on 2022    Historical Provider, MD   glimepiride (AMARYL) 2 MG tablet Take 4 mg by mouth every morning (before breakfast)    Historical Provider, MD   pravastatin (PRAVACHOL) 20 MG tablet Take 20 mg by mouth daily. Patient not taking: Reported on 2022    Historical Provider, MD   tamsulosin (FLOMAX) 0.4 MG capsule Take 1 capsule by mouth daily for 7 days.  11  Cherri Mccloud PA-C Current medications:    Current Facility-Administered Medications   Medication Dose Route Frequency Provider Last Rate Last Admin    sodium chloride flush 0.9 % injection 5-40 mL  5-40 mL IntraVENous 2 times per day April James-Hernan, APRN - CNP        sodium chloride flush 0.9 % injection 5-40 mL  5-40 mL IntraVENous PRN April James-Hernan, APRN - CNP        0.9 % sodium chloride infusion   IntraVENous PRN April Morovis-Hernan, APRN - CNP        bisacodyl (DULCOLAX) suppository 10 mg  10 mg Rectal Daily PRN April Storey-Lakeside, APRN - CNP        trimethobenzamide Vallarie Joy) injection 200 mg  200 mg IntraMUSCular Q6H PRN April Morovis-Lakeside, APRN - CNP        glucose chewable tablet 16 g  4 tablet Oral PRN April Storey-Hernan, APRN - CNP        dextrose bolus 10% 125 mL  125 mL IntraVENous PRN April Morovis-Hernan, APRN - CNP        Or    dextrose bolus 10% 250 mL  250 mL IntraVENous PRN [facility], APRN - CNP   Stopped at 09/20/22 0706    glucagon (rDNA) injection 1 mg  1 mg SubCUTAneous PRN April Storey-Hernan, APRN - CNP        dextrose 10 % infusion   IntraVENous Continuous PRN April Storey-Hernan, APRN - CNP        insulin glargine (LANTUS) injection vial 30 Units  30 Units SubCUTAneous Nightly April Morovis-Lakeside, APRN - CNP        pravastatin (PRAVACHOL) tablet 20 mg  20 mg Oral Nightly April Storey-Lakeside, APRN - CNP        insulin lispro (HUMALOG) injection vial 0-4 Units  0-4 Units SubCUTAneous TID [facility] April Storey-Hernan, APRN - CNP        insulin lispro (HUMALOG) injection vial 0-4 Units  0-4 Units SubCUTAneous Nightly April James-Hernan, APRN - CNP        morphine (PF) injection 1 mg  1 mg IntraVENous Q6H PRN April James-Lakeside, APRN - CNP        morphine (PF) injection 2 mg  2 mg IntraVENous Q6H PRN April [facility], APRN - CNP        0.9 % sodium chloride infusion  500 mL IntraVENous Continuous Jacqueline Bowles  mL/hr at 09/20/22 0631 500 mL at 09/20/22 0631       Allergies:  No Known Allergies    Problem List:    Patient Active Problem List   Diagnosis Code    MIKKI (acute kidney injury) ([facility] N17.9    Hydronephrosis with renal calculous obstruction N13.2    HLD (hyperlipidemia) E78.5    Diabetes mellitus ([facility] E11.9       Past Medical History:        Diagnosis Date    Back pain     Diabetes mellitus ([facility]     Kidney stone        Past Surgical History:  History reviewed. No pertinent surgical history. Social History:    Social History     Tobacco Use    Smoking status: Never    Smokeless tobacco: Not on file   Substance Use Topics    Alcohol use: Yes     Comment: social                                Counseling given: Not Answered      Vital Signs (Current):   Vitals:    09/19/22 1514 09/19/22 2238 09/20/22 0111 09/20/22 0720   BP: (!) 147/105 (!) 174/83 (!) 132/95 118/69   Pulse: 97 86 77 60   Resp: 18 16 16 16   Temp: 37.2 °C (98.9 °F) 36.9 °C (98.4 °F) 36.7 °C (98.1 °F) 36.7 °C (98.1 °F)   TempSrc:  Oral Oral Oral   SpO2: 97% 97% 99% 98%   Weight: 190 lb (86.2 kg)      Height: 5' 8\" (1.727 m)                                                 BP Readings from Last 3 Encounters:   09/20/22 118/69   04/22/17 (!) 172/109       NPO Status:  9/19/22 2100                                                                               BMI:   Wt Readings from Last 3 Encounters:   09/19/22 190 lb (86.2 kg)   04/22/17 200 lb (90.7 kg)     Body mass index is 28.89 kg/m².     CBC:   Lab Results   Component Value Date/Time    WBC 7.5 09/20/2022 06:30 AM    RBC 4.19 09/20/2022 06:30 AM    HGB 12.9 09/20/2022 06:30 AM    HCT 37.9 09/20/2022 06:30 AM    MCV 90.5 09/20/2022 06:30 AM    RDW 12.5 09/20/2022 06:30 AM     09/20/2022 06:30 AM       CMP:   Lab Results   Component Value Date/Time     09/20/2022 06:30 AM    K 4.4 09/20/2022 06:30 AM     09/20/2022 06:30 AM    CO2 23 09/20/2022 06:30 AM    BUN 36 09/20/2022 06:30 AM    CREATININE 2.8 09/20/2022 06:30 AM    GFRAA 30 09/20/2022 06:30 AM    LABGLOM 24 09/20/2022 06:30 AM    GLUCOSE 63 09/20/2022 06:30 AM    GLUCOSE 200 12/17/2011 04:25 PM    PROT 6.9 09/20/2022 06:30 AM    CALCIUM 9.4 09/20/2022 06:30 AM    BILITOT 0.4 09/20/2022 06:30 AM    ALKPHOS 50 09/20/2022 06:30 AM    AST 21 09/20/2022 06:30 AM    ALT 14 09/20/2022 06:30 AM       POC Tests: No results for input(s): POCGLU, POCNA, POCK, POCCL, POCBUN, POCHEMO, POCHCT in the last 72 hours. Coags: No results found for: PROTIME, INR, APTT    HCG (If Applicable): No results found for: PREGTESTUR, PREGSERUM, HCG, HCGQUANT     ABGs: No results found for: PHART, PO2ART, KBB5VKF, TBK6UJB, BEART, A4NIAHLH     Type & Screen (If Applicable):  No results found for: LABABO, LABRH    Drug/Infectious Status (If Applicable):  No results found for: HIV, HEPCAB    COVID-19 Screening (If Applicable): No results found for: COVID19        Anesthesia Evaluation  Patient summary reviewed no history of anesthetic complications:   Airway: Mallampati: III  TM distance: >3 FB   Neck ROM: full  Mouth opening: > = 3 FB   Dental:    (+) poor dentition  Comment: Denies loose teeth    Pulmonary:Negative Pulmonary ROS and normal exam  breath sounds clear to auscultation      (-) not a current smoker                           Cardiovascular:  Exercise tolerance: good (>4 METS),   (+) hypertension:, hyperlipidemia        Rhythm: regular  Rate: normal           : Last toprol 0800 9/19/22. Neuro/Psych:   Negative Neuro/Psych ROS              GI/Hepatic/Renal:   (+) renal disease ( MIKKI): kidney stones,           Endo/Other:    (+) DiabetesType II DM, using insulin, . Abdominal:             Vascular: negative vascular ROS. Other Findings:           Anesthesia Plan      MAC     ASA 3       Induction: intravenous. Anesthetic plan and risks discussed with patient.       Plan discussed with CRNA and attending. Nikki Worrell RN   9/20/2022  Patient seen and evaluated ANA Meyer - CRNA    Pt seen, examined, chart reviewed, plan discussed.   Jenise Meza MD  9/20/2022  12:54 PM

## 2022-09-20 NOTE — ED PROVIDER NOTES
[address]  Department of Emergency Medicine   ED  Encounter Note  Admit Date/RoomTime: 2022  7:25 PM  ED Room: 0516/0516-A    NAME: Jesus Grossman  : 1975  MRN: [id number]     Chief Complaint:  Hematuria (Started this am)    History of Present Illness       Jesus Grossman is a 52 y.o. old male who presents to the emergency department by private vehicle, for hematuria, which occured 1 day(s) prior to arrival.  Since onset the symptoms have been persistent and mild in severity. Symptoms are associated with no physical symptoms, however, patient is under surveillance right now for a sudden increase in his creatinine. Patient is a diabetic and reports his creatinines usually run around 1.5. About a month ago his PCP noted his creatinine was becoming elevated. He was referred to nephrology and they scheduled him for ultrasound at [facility]. After they got the result of the ultrasound they advised him to come to emergency. Jesus Grossman has a history of kidney stones. He reports he has never had to have lithotripsy or stents placed due to the kidney stones. He denies any abdominal pain, back pain, fever, chills, nausea, vomiting, or dysuria. ROS   Pertinent positives and negatives are stated within HPI, all other systems reviewed and are negative. Past Medical History:  has a past medical history of Back pain, Diabetes mellitus ([address] and Kidney stone. Surgical History:  has no past surgical history on file. Social History:  reports current alcohol use. He reports that he does not use drugs. Family History: family history is not on file. Allergies: Patient has no known allergies.     Physical Exam   Oxygen Saturation Interpretation: Normal.        ED Triage Vitals   BP Temp Temp Source Heart Rate Resp SpO2 Height Weight   22 1514 22 1514 22 2238 22 1514 22 1514 22 1514 22 1514 22 1514   (!) 147/105 98.9 °F (37.2 °C) Oral 97 18 97 % 5' 8\" (1.727 m) 190 lb (86.2 kg)         Constitutional:  Alert, development consistent with age. HEENT:  NC/NT. Airway patent. Neck:  Normal ROM. Supple. Respiratory:  Lungs Clear to auscultation and breath sounds equal.  CV:  Regular rate and rhythm, normal heart sounds, without pathological murmurs, ectopy, gallops, or rubs. GI:  normal appearing, non-distended with no visible hernias. Bowel sounds: normal bowel sounds. Tenderness: No abdominal tenderness, guarding, rebound, rigidity or pulsatile mass. .        Liver: non-tender. Spleen:  non-tender. : (chaperone present during examination). not indicated / deferred. Back: CVA Tenderness: No CVA tenderness. Integument:  Normal turgor. Warm, dry, without visible rash, unless noted elsewhere. Lymphatics: No lymphangitis or adenopathy noted. Neurological:  Oriented. Motor functions intact.     Lab / Imaging Results   (All laboratory and radiology results have been personally reviewed by myself)  Labs:  Results for orders placed or performed during the hospital encounter of 09/19/22   CBC with Auto Differential   Result Value Ref Range    WBC 9.2 4.5 - 11.5 E9/L    RBC 4.31 3.80 - 5.80 E12/L    Hemoglobin 13.2 12.5 - 16.5 g/dL    Hematocrit 38.7 37.0 - 54.0 %    MCV 89.8 80.0 - 99.9 fL    MCH 30.6 26.0 - 35.0 pg    MCHC 34.1 32.0 - 34.5 %    RDW 12.4 11.5 - 15.0 fL    Platelets 019 461 - 544 E9/L    MPV 9.0 7.0 - 12.0 fL    Neutrophils % 63.4 43.0 - 80.0 %    Immature Granulocytes % 0.3 0.0 - 5.0 %    Lymphocytes % 27.1 20.0 - 42.0 %    Monocytes % 7.9 2.0 - 12.0 %    Eosinophils % 0.9 0.0 - 6.0 %    Basophils % 0.4 0.0 - 2.0 %    Neutrophils Absolute 5.83 1.80 - 7.30 E9/L    Immature Granulocytes # 0.03 E9/L    Lymphocytes Absolute 2.50 1.50 - 4.00 E9/L    Monocytes Absolute 0.73 0.10 - 0.95 E9/L    Eosinophils Absolute 0.08 0.05 - 0.50 E9/L    Basophils Absolute 0.04 0.00 - 0.20 O3/V   Basic Metabolic Panel   Result Value Ref Range    Sodium 139 132 - 146 mmol/L    Potassium 4.3 3.5 - 5.0 mmol/L    Chloride 101 98 - 107 mmol/L    CO2 24 22 - 29 mmol/L    Anion Gap 14 7 - 16 mmol/L    Glucose 113 (H) 74 - 99 mg/dL    BUN 40 (H) 6 - 20 mg/dL    Creatinine 3.1 (H) 0.7 - 1.2 mg/dL    GFR Non-African American 22 >=60 mL/min/1.73    GFR African American 26     Calcium 10.0 8.6 - 10.2 mg/dL   Urinalysis   Result Value Ref Range    Color, UA Yellow Straw/Yellow    Clarity, UA SL CLOUDY Clear    Glucose, Ur >=1000 (A) Negative mg/dL    Bilirubin Urine Negative Negative    Ketones, Urine 15 (A) Negative mg/dL    Specific Gravity, UA 1.015 1.005 - 1.030    Blood, Urine LARGE (A) Negative    pH, UA 6.0 5.0 - 9.0    Protein, UA 30 (A) Negative mg/dL    Urobilinogen, Urine 0.2 <2.0 E.U./dL    Nitrite, Urine Negative Negative    Leukocyte Esterase, Urine Negative Negative   Microscopic Urinalysis   Result Value Ref Range    WBC, UA 1-3 0 - 5 /HPF    RBC, UA >20 0 - 2 /HPF    Epithelial Cells, UA RARE /HPF    Bacteria, UA NONE SEEN None Seen /HPF       Imaging: All Radiology results interpreted by Radiologist unless otherwise noted. CT ABDOMEN PELVIS WO CONTRAST Additional Contrast? None   Final Result   6 mm obstructing calculus right mid ureter with moderate to severe right   hydronephrosis. Punctate bilateral nonobstructing renal calculi. ED Course / Medical Decision Making     Medications   0.9 % sodium chloride infusion (has no administration in time range)   0.9 % sodium chloride bolus (0 mLs IntraVENous Stopped 9/19/22 2232)        Re-examination:  9/20/22       Time: Patient has had no discomfort while in department. Consults:   IP CONSULT TO UROLOGY  IP CONSULT TO HOSPITALIST  I spoke with Dr. Perez Boland patient should be admitted under medical service with nephrology consult and plan for cystoscopy with stent placement tomorrow.   Procedures:   None    Medical Decision Making:    History as above. Patient is a well-appearing patient with recent increase in his creatinine levels. Primary care referred him to nephrology who ultimately sent him for ultrasound at [facility] where they found swelling around the right kidney. He was seen in department today and CAT scan shows a 6 mm obstructing stone. Creatinines usually run around 1.5 in this patient and have been 3.1 for the last 2 draws and they were 3.1 in department today. Patient is having no pain associated with this issue, no fevers, no chills, no nausea, no vomiting. Urology was consulted and patient needs to be admitted for cystoscopy and stent placement. Hospitalist consulted for medical admission. Nephrology to consult. Plan of Care/Counseling:  Jyoti Navarro PA-C and EM Attending Physician reviewed today's visit with the patient and wife in addition to providing specific details for the plan of care and counseling regarding the diagnosis and prognosis. Questions are answered at this time and are agreeable with the plan. Assessment      1. MIKKI (acute kidney injury) ([address]    2. Kidney stone      Plan   Disposition:   Inpatient Admission to General Medical Floor. Patient condition is stable    New Medications     Current Discharge Medication List        Electronically signed by Jyoti Navarro PA-C   DD: 9/20/22  **This report was transcribed using voice recognition software. Every effort was made to ensure accuracy; however, inadvertent computerized transcription errors may be present.   END OF ED PROVIDER NOTE           Jyoti Navarro PA-C  09/20/22 8844

## 2022-09-20 NOTE — ED NOTES
Pt states that he has been having a lot of blood in his urine, the dr has been testing his kidneys.       Xiang Calhoun RN  09/19/22 2022

## 2022-09-21 VITALS
SYSTOLIC BLOOD PRESSURE: 129 MMHG | HEART RATE: 88 BPM | HEIGHT: 68 IN | OXYGEN SATURATION: 95 % | WEIGHT: 198 LBS | TEMPERATURE: 99.3 F | BODY MASS INDEX: 30.01 KG/M2 | RESPIRATION RATE: 16 BRPM | DIASTOLIC BLOOD PRESSURE: 73 MMHG

## 2022-09-21 LAB
ANION GAP SERPL CALCULATED.3IONS-SCNC: 9 MMOL/L (ref 7–16)
BUN BLDV-MCNC: 29 MG/DL (ref 6–20)
CALCIUM SERPL-MCNC: 8.9 MG/DL (ref 8.6–10.2)
CHLORIDE BLD-SCNC: 102 MMOL/L (ref 98–107)
CO2: 25 MMOL/L (ref 22–29)
CREAT SERPL-MCNC: 2.6 MG/DL (ref 0.7–1.2)
GFR AFRICAN AMERICAN: 32
GFR NON-AFRICAN AMERICAN: 27 ML/MIN/1.73
GLUCOSE BLD-MCNC: 221 MG/DL (ref 74–99)
METER GLUCOSE: 147 MG/DL (ref 74–99)
METER GLUCOSE: 196 MG/DL (ref 74–99)
METER GLUCOSE: 247 MG/DL (ref 74–99)
METER GLUCOSE: 253 MG/DL (ref 74–99)
METER GLUCOSE: 65 MG/DL (ref 74–99)
METER GLUCOSE: 95 MG/DL (ref 74–99)
POTASSIUM SERPL-SCNC: 4.7 MMOL/L (ref 3.5–5)
SODIUM BLD-SCNC: 136 MMOL/L (ref 132–146)

## 2022-09-21 PROCEDURE — 36415 COLL VENOUS BLD VENIPUNCTURE: CPT

## 2022-09-21 PROCEDURE — 82962 GLUCOSE BLOOD TEST: CPT

## 2022-09-21 PROCEDURE — 6370000000 HC RX 637 (ALT 250 FOR IP): Performed by: INTERNAL MEDICINE

## 2022-09-21 PROCEDURE — G0378 HOSPITAL OBSERVATION PER HR: HCPCS

## 2022-09-21 PROCEDURE — 2580000003 HC RX 258: Performed by: UROLOGY

## 2022-09-21 PROCEDURE — 80048 BASIC METABOLIC PNL TOTAL CA: CPT

## 2022-09-21 RX ORDER — SODIUM BICARBONATE 650 MG/1
650 TABLET ORAL 2 TIMES DAILY
COMMUNITY

## 2022-09-21 RX ORDER — AMLODIPINE BESYLATE 10 MG/1
10 TABLET ORAL DAILY
Status: DISCONTINUED | OUTPATIENT
Start: 2022-09-21 | End: 2022-09-21 | Stop reason: HOSPADM

## 2022-09-21 RX ORDER — LOSARTAN POTASSIUM 25 MG/1
25 TABLET ORAL DAILY
Status: DISCONTINUED | OUTPATIENT
Start: 2022-09-21 | End: 2022-09-21 | Stop reason: HOSPADM

## 2022-09-21 RX ORDER — METOPROLOL SUCCINATE 50 MG/1
50 TABLET, EXTENDED RELEASE ORAL DAILY
Status: DISCONTINUED | OUTPATIENT
Start: 2022-09-21 | End: 2022-09-21 | Stop reason: HOSPADM

## 2022-09-21 RX ADMIN — METOPROLOL SUCCINATE 50 MG: 50 TABLET, FILM COATED, EXTENDED RELEASE ORAL at 12:56

## 2022-09-21 RX ADMIN — SODIUM CHLORIDE, POTASSIUM CHLORIDE, SODIUM LACTATE AND CALCIUM CHLORIDE: 600; 310; 30; 20 INJECTION, SOLUTION INTRAVENOUS at 07:29

## 2022-09-21 RX ADMIN — LOSARTAN POTASSIUM 25 MG: 25 TABLET, FILM COATED ORAL at 12:56

## 2022-09-21 RX ADMIN — AMLODIPINE BESYLATE 10 MG: 10 TABLET ORAL at 12:56

## 2022-09-21 ASSESSMENT — PAIN SCALES - GENERAL
PAINLEVEL_OUTOF10: 2
PAINLEVEL_OUTOF10: 1
PAINLEVEL_OUTOF10: 1

## 2022-09-21 ASSESSMENT — PAIN DESCRIPTION - LOCATION
LOCATION: GENERALIZED
LOCATION: GROIN

## 2022-09-21 ASSESSMENT — PAIN DESCRIPTION - DESCRIPTORS
DESCRIPTORS: DISCOMFORT
DESCRIPTORS: DISCOMFORT

## 2022-09-21 NOTE — CONSULTS
Nephrology Consult Note  Patient's Name: Natanael Hodge  1:40 PM  9/21/2022    Nephrologist: Shaunna Alejandro    Reason for Consult:  CKD  Requesting Physician:  Dr. Amina Villar. Imelda     Chief Complaint:  Hematuria    History Obtained From:  patient and past medical records    History of Present Ilness:    Natanael Hodge is a 52 y.o. male with prior history of CKD G3A with a baseline serum cr 1.64mg/dl in March of 2022. He has a Hx of DM2, HTN and Nephrolithiasis. He follows longitudinally in the office with Dr. Fabiola Piña and was last seen 5/5/22 and was to be seen in office 5/21/22. Simon Collins presented to the ED 9/19/22 with Hematuria and after being advised by our office to present when he had an [address] at [address] showing R Hydronephrosis. He notes he has had chronic back worse over the last 2 months. He had been using 2-3 NSAID daily until apprx 1 week ago. He denied any fever or chills. His cr over the last 2 weeks was 3.6 on 9/8/22, 3.0 on 9/13/22 to 3.1 on 9/19/22. His CT Scan in the ED  showed a 6mm ureteral calculi with mod-severe hydronephrosis. He underwent on 9/20/22Cysto-Retro Pyelogram Ureteroscopy J Stent placement on the R    Past Medical History:   Diagnosis Date    Back pain     Diabetes mellitus ([address]     Kidney stone        Past Surgical History:   Procedure Laterality Date    LITHOTRIPSY Right 9/20/2022    CYSTOSCOPY RETROGRADE PYELOGRAM URETEROSCOPY J STENT RIGHT performed by Betsy Blandon MD at [address]       History reviewed. No pertinent family history. reports current alcohol use. He reports that he does not use drugs. Allergies:  Patient has no known allergies.     Current Medications:    amLODIPine (NORVASC) tablet 10 mg, Daily  metoprolol succinate (TOPROL XL) extended release tablet 50 mg, Daily  losartan (COZAAR) tablet 25 mg, Daily  sodium chloride flush 0.9 % injection 5-40 mL, 2 times per day  sodium chloride flush 0.9 % injection 5-40 mL, PRN  0.9 % sodium chloride infusion, PRN  bisacodyl (DULCOLAX) suppository 10 mg, Daily PRN  trimethobenzamide (TIGAN) injection 200 mg, Q6H PRN  glucose chewable tablet 16 g, PRN  dextrose bolus 10% 125 mL, PRN   Or  dextrose bolus 10% 250 mL, PRN  glucagon (rDNA) injection 1 mg, PRN  dextrose 10 % infusion, Continuous PRN  insulin glargine (LANTUS) injection vial 30 Units, Nightly  pravastatin (PRAVACHOL) tablet 20 mg, Nightly  insulin lispro (HUMALOG) injection vial 0-4 Units, TID WC  insulin lispro (HUMALOG) injection vial 0-4 Units, Nightly  morphine (PF) injection 1 mg, Q6H PRN  morphine (PF) injection 2 mg, Q6H PRN  lactated ringers infusion, Continuous  oxyCODONE-acetaminophen (PERCOCET) 5-325 MG per tablet 1 tablet, Q4H PRN  0.9 % sodium chloride infusion, Continuous      Review of Systems:   Pertinent items are noted in HPI.     Physical exam:   Constitutional:    Vitals: VITALS:  BP (!) 146/78   Pulse 82   Temp 98.7 °F (37.1 °C) (Oral)   Resp 16   Ht 5' 8\" (1.727 m)   Wt 198 lb (89.8 kg)   SpO2 94%   BMI 30.11 kg/m²   24HR INTAKE/OUTPUT:    Intake/Output Summary (Last 24 hours) at 9/21/2022 1340  Last data filed at 9/20/2022 1511  Gross per 24 hour   Intake 800 ml   Output 50 ml   Net 750 ml     URINARY CATHETER OUTPUT (Davila):     DRAIN/TUBE OUTPUT:     VENT SETTINGS:     Additional Respiratory Assessments  Heart Rate: 82  Resp: 16  SpO2: 94 %  Skin: no rash, turgor wnl  Heent:  eomi, mmm  Neck: no bruits or jvd noted  Cardiovascular:  S1, S2 without m/r/g  Respiratory: CTA B without w/r/r  Abdomen:  +bs, soft, nt, nd  Ext: (-) bilat lower extremity edema  Psychiatric: mood and affect appropriate  Musculoskeletal:  Rom, muscular strength intact    Data:   Labs:  CBC:   Lab Results   Component Value Date/Time    WBC 7.5 09/20/2022 06:30 AM    RBC 4.19 09/20/2022 06:30 AM    HGB 12.9 09/20/2022 06:30 AM    HCT 37.9 09/20/2022 06:30 AM    MCV 90.5 09/20/2022 06:30 AM    MCH 30.8 09/20/2022 06:30 AM    MCHC 34.0 09/20/2022 06:30 AM    RDW 12.5 09/20/2022 06:30 AM     09/20/2022 06:30 AM    MPV 9.3 09/20/2022 06:30 AM     CBC with Differential:    Lab Results   Component Value Date/Time    WBC 7.5 09/20/2022 06:30 AM    RBC 4.19 09/20/2022 06:30 AM    HGB 12.9 09/20/2022 06:30 AM    HCT 37.9 09/20/2022 06:30 AM     09/20/2022 06:30 AM    MCV 90.5 09/20/2022 06:30 AM    MCH 30.8 09/20/2022 06:30 AM    MCHC 34.0 09/20/2022 06:30 AM    RDW 12.5 09/20/2022 06:30 AM    SEGSPCT 88 12/17/2011 04:25 PM    LYMPHOPCT 30.3 09/20/2022 06:30 AM    MONOPCT 9.3 09/20/2022 06:30 AM    BASOPCT 0.7 09/20/2022 06:30 AM    MONOSABS 0.69 09/20/2022 06:30 AM    LYMPHSABS 2.26 09/20/2022 06:30 AM    EOSABS 0.12 09/20/2022 06:30 AM    BASOSABS 0.05 09/20/2022 06:30 AM     Hemoglobin/Hematocrit:    Lab Results   Component Value Date/Time    HGB 12.9 09/20/2022 06:30 AM    HCT 37.9 09/20/2022 06:30 AM     CMP:    Lab Results   Component Value Date/Time     09/21/2022 08:56 AM    K 4.7 09/21/2022 08:56 AM    K 4.4 09/20/2022 06:30 AM     09/21/2022 08:56 AM    CO2 25 09/21/2022 08:56 AM    BUN 29 09/21/2022 08:56 AM    CREATININE 2.6 09/21/2022 08:56 AM    GFRAA 32 09/21/2022 08:56 AM    LABGLOM 27 09/21/2022 08:56 AM    GLUCOSE 221 09/21/2022 08:56 AM    GLUCOSE 200 12/17/2011 04:25 PM    PROT 6.9 09/20/2022 06:30 AM    LABALBU 3.9 09/20/2022 06:30 AM    LABALBU 4.7 12/17/2011 04:25 PM    CALCIUM 8.9 09/21/2022 08:56 AM    BILITOT 0.4 09/20/2022 06:30 AM    ALKPHOS 50 09/20/2022 06:30 AM    AST 21 09/20/2022 06:30 AM    ALT 14 09/20/2022 06:30 AM     BMP:    Lab Results   Component Value Date/Time     09/21/2022 08:56 AM    K 4.7 09/21/2022 08:56 AM    K 4.4 09/20/2022 06:30 AM     09/21/2022 08:56 AM    CO2 25 09/21/2022 08:56 AM    BUN 29 09/21/2022 08:56 AM    LABALBU 3.9 09/20/2022 06:30 AM    LABALBU 4.7 12/17/2011 04:25 PM    CREATININE 2.6 09/21/2022 08:56 AM    CALCIUM 8.9 09/21/2022 08:56 AM    GFRAA 32 09/21/2022 08:56 AM    LABGLOM 27 09/21/2022 08:56 AM    GLUCOSE 221 09/21/2022 08:56 AM    GLUCOSE 200 12/17/2011 04:25 PM     Hepatic Function Panel:    Lab Results   Component Value Date/Time    ALKPHOS 50 09/20/2022 06:30 AM    ALT 14 09/20/2022 06:30 AM    AST 21 09/20/2022 06:30 AM    PROT 6.9 09/20/2022 06:30 AM    BILITOT 0.4 09/20/2022 06:30 AM    LABALBU 3.9 09/20/2022 06:30 AM    LABALBU 4.7 12/17/2011 04:25 PM     Albumin:    Lab Results   Component Value Date/Time    LABALBU 3.9 09/20/2022 06:30 AM    LABALBU 4.7 12/17/2011 04:25 PM     Calcium:    Lab Results   Component Value Date/Time    CALCIUM 8.9 09/21/2022 08:56 AM     Ionized Calcium:  No results found for: IONCA  Magnesium:  No results found for: MG  Phosphorus:    Lab Results   Component Value Date/Time    PHOS 4.3 09/19/2022 08:11 AM     LDH:  No results found for: LDH  Uric Acid:    Lab Results   Component Value Date/Time    LABURIC 6.8 09/19/2022 08:11 AM     PT/INR:  No results found for: PROTIME, INR  PTT:  No results found for: APTT, PTT[APTT}  Troponin:  No results found for: TROPONINI  U/A:    Lab Results   Component Value Date/Time    COLORU Yellow 09/19/2022 07:57 PM    PROTEINU 30 09/19/2022 07:57 PM    PHUR 6.0 09/19/2022 07:57 PM    WBCUA 1-3 09/19/2022 07:57 PM    WBCUA PACKED 12/17/2011 05:30 PM    RBCUA >20 09/19/2022 07:57 PM    RBCUA PACKED 12/17/2011 05:30 PM    BACTERIA NONE SEEN 09/19/2022 07:57 PM    CLARITYU SL CLOUDY 09/19/2022 07:57 PM    SPECGRAV 1.015 09/19/2022 07:57 PM    LEUKOCYTESUR Negative 09/19/2022 07:57 PM    UROBILINOGEN 0.2 09/19/2022 07:57 PM    BILIRUBINUR Negative 09/19/2022 07:57 PM    BILIRUBINUR SMALL 12/17/2011 05:30 PM    BLOODU LARGE 09/19/2022 07:57 PM    GLUCOSEU >=1000 09/19/2022 07:57 PM    GLUCOSEU 100 12/17/2011 05:30 PM     ABG:  No results found for: PH, PCO2, PO2, HCO3, BE, THGB, TCO2, O2SAT  HgBA1c:  No results found for: LABA1C  Microalbumen/Creatinine ratio:  No components found for: RUCREAT  FLP:  No results found for: TRIG, HDL, LDLCALC, LDLDIRECT, LABVLDL  TSH:  No results found for: TSH  VITAMIN B12: No components found for: B12  FOLATE:  No results found for: FOLATE  Iron Saturation:  No components found for: PERCENTFE  TIBC:  No results found for: TIBC  FERRITIN:  No results found for: FERRITIN  LIPASE:  No results found for: LIPASE  Fibrinogen Level:  No components found for: FIB  Urine Toxicology:  No components found for: IAMMENTA, IBARBIT, IBENZO, ICOCAINE, IMARTHC, IOPIATES, IPHENCYC     Imaging:  EXAMINATION:  RETROPERITONEAL ULTRASOUND OF THE KIDNEYS AND URINARY BLADDER     4/14/2022     COMPARISON:  None     HISTORY:  ORDERING SYSTEM PROVIDED HISTORY: Abnormal kidney function study  TECHNOLOGIST PROVIDED HISTORY:  What reading provider will be dictating this exam?->CRC     FINDINGS:     Kidneys: The right kidney measures 10 cm in length and the left kidney measures 10.1  cm in length. No hydronephrosis. Suspicious for increased renal echogenicity        Bladder:     Is not well distended for evaluation with volume at 90 cc. Jets noted. Impression  Suspicious for medical renal disease without obstruction. EXAMINATION:  CT OF THE ABDOMEN AND PELVIS WITHOUT CONTRAST 9/19/2022 9:01 pm     TECHNIQUE:  CT of the abdomen and pelvis was performed without the administration of  intravenous contrast. Multiplanar reformatted images are provided for review. Automated exposure control, iterative reconstruction, and/or weight based  adjustment of the mA/kV was utilized to reduce the radiation dose to as low  as reasonably achievable. COMPARISON:  None.      HISTORY:  ORDERING SYSTEM PROVIDED HISTORY: hematuria, creatinine doubled  TECHNOLOGIST PROVIDED HISTORY:  Reason for exam:->hematuria, creatinine doubled  Additional Contrast?->None  Decision Support Exception - unselect if not a suspected or confirmed  emergency medical condition->Emergency Medical Condition (MA)     FINDINGS:  Lower Chest: Visualized lungs are normal.     Organs: The liver, spleen, adrenal glands, pancreas and gallbladder are  normal.  Is 6 mm obstructing calculi right mid ureter with moderate to severe  right hydronephrosis. Bilateral punctate nonobstructing renal calculi. GI/bowel: Normal large and small bowel. Normal appendix. Pelvis: Normal urinary bladder. Peritoneum/Retroperitoneum: No free fluid or free air. Bones/Soft Tissues: Unremarkable. Impression  6 mm obstructing calculus right mid ureter with moderate to severe right  hydronephrosis. Punctate bilateral nonobstructing renal calculi. Assessment  1-Stage II MIKKI in the setting of the Obstructive Uropathy  UA Gluc >1000, bili (-), ketones tr, SG 1.015, blood large, pH 6.0, protein tr-30, Ni & LE (-), RBC >20, WBC 0-3  9/20/22 S/P Cysto-Retro Pyelogram Ureteroscopy J Stent placement on the R  PLAN:  1. Follow labs  2. Hold ARB, SGLT2i, metformin, NSAID    2-Nephrolithiasis  S/P R Ureteral Stent  PLAN:  Await the 24hr urine for stone evaluation    3-CKD G3A with a baseline serum cr 1.64mg/dl and an e-GFR=49ml/min in March of 2022 presumed sec to microvascular disease in the setting of DM2, HTN, Dyslipidemia  PLAN:  1. Continue to monitor    4-HTN with CKD I-IV  BP goal <120/80  PLAN:  1.BP overall near goal since admission-no new additions  2.  Hold the ARB      Thank you for allowing us to participate in care of Catrina Martel       Nell Faye MD  1:40 PM  9/21/2022

## 2022-09-21 NOTE — PATIENT CARE CONFERENCE
P Quality Flow/Interdisciplinary Rounds Progress Note        Quality Flow Rounds held on September 21, 2022    Disciplines Attending:  Bedside Nurse, , , and Nursing Unit Leadership    Danna Beach was admitted on 9/19/2022  7:25 PM    Anticipated Discharge Date:       Disposition:    Roger Score:  Roger Scale Score: 22    Readmission Risk              Risk of Unplanned Readmission:  0           Discussed patient goal for the day, patient clinical progression, and barriers to discharge.   The following Goal(s) of the Day/Commitment(s) have been identified:  Labs - Report Results      Eric Thayer RN  September 21, 2022

## 2022-09-21 NOTE — PROGRESS NOTES
Hospitalist Progress Note      PCP: Luigi Rosales MD    Date of Admission: 2022      Subjective:   FEELING BETTER STILL HAVING LIGHT RED URINE      Hospital Course:    52 y.o. male with prior history of CKD G3A with a baseline serum cr 1.64mg/dl in 2022. He has a Hx of DM2, HTN and Nephrolithiasis. He follows longitudinally in the office with Dr. Rolan Napoles and was last seen 22 and was to be seen in office 22. Ruthie Cano presented to the ED 22 with Hematuria and after being advised by our office to present when he had an [address] at [address] showing R Hydronephrosis. He notes he has had chronic back worse over the last 2 months. He had been using 2-3 NSAID daily until apprx 1 week ago. He denied any fever or chills. His cr over the last 2 weeks was 3.6 on 22, 3.0 on 22 to 3.1 on 22. His CT Scan in the ED  showed a 6mm ureteral calculi with mod-severe hydronephrosis. He underwent on 22Cysto-Retro Pyelogram Ureteroscopy J Stent placement on the R**  CREATINE IS 2.6** HOLD ARB PER NEPHROLOGY          Medications:  Reviewed    Infusion Medications    sodium chloride      dextrose      lactated ringers 125 mL/hr at 22 0729    sodium chloride Stopped (22 1153)     Scheduled Medications    amLODIPine  10 mg Oral Daily    metoprolol succinate  50 mg Oral Daily    [Held by provider] losartan  25 mg Oral Daily    sodium chloride flush  5-40 mL IntraVENous 2 times per day    insulin glargine  30 Units SubCUTAneous Nightly    pravastatin  20 mg Oral Nightly    insulin lispro  0-4 Units SubCUTAneous TID WC    insulin lispro  0-4 Units SubCUTAneous Nightly     PRN Meds: sodium chloride flush, sodium chloride, bisacodyl, trimethobenzamide, glucose, dextrose bolus **OR** dextrose bolus, glucagon (rDNA), dextrose, morphine, morphine, oxyCODONE-acetaminophen      Intake/Output Summary (Last 24 hours) at 2022 1350  Last data filed at 2022 1511  Gross per 24 hour Intake 800 ml   Output 50 ml   Net 750 ml       Exam:    BP (!) 146/78   Pulse 82   Temp 98.7 °F (37.1 °C) (Oral)   Resp 16   Ht 5' 8\" (1.727 m)   Wt 198 lb (89.8 kg)   SpO2 94%   BMI 30.11 kg/m²       General appearance:  No apparent distress,   HEENT:  Normal cephalic, atraumatic without obvious deformity. Pupils equal, round, and reactive to light. Extra ocular muscles intact. Conjunctivae/corneas clear. Neck: Supple, with full range of motion. No jugular venous distention. Trachea midline. Respiratory:  Normal respiratory effort. Clear to auscultation, bilaterally without Rales/Wheezes/Rhonchi. Cardiovascular:  Regular rate and rhythm   Abdomen: Soft, non-tender, non-distended with normal bowel sounds. Musculoskeletal:  No clubbing, cyanosis or edema bilaterally. Skin: Skin color, texture, turgor normal.  No rashes or lesions. Neurologic:  Neurovascularly intact . Psychiatric:  Alert and oriented, thought content appropriate, normal insight                Labs:   Recent Labs     09/19/22 1957 09/20/22  0630   WBC 9.2 7.5   HGB 13.2 12.9   HCT 38.7 37.9    229     Recent Labs     09/19/22  0811 09/19/22 1957 09/20/22  0630 09/21/22  0856    139 140 136   K 4.7 4.3 4.4 4.7    101 106 102   CO2 23 24 23 25   BUN 37* 40* 36* 29*   CREATININE 3.1* 3.1* 2.8* 2.6*   CALCIUM 9.6 10.0 9.4 8.9   PHOS 4.3  --   --   --      Recent Labs     09/19/22  0811 09/20/22  0630   AST 20 21   ALT 15 14   BILITOT 0.4 0.4   ALKPHOS 53 50     No results for input(s): INR in the last 72 hours. No results for input(s): Prentis Revels in the last 72 hours. Recent Labs     09/19/22 0811 09/20/22  0630   AST 20 21   ALT 15 14   BILITOT 0.4 0.4   ALKPHOS 53 50     No results for input(s): LACTA in the last 72 hours.   Lab Results   Component Value Date    LABURIC 6.8 09/19/2022     No results found for: AMMONIA    Assessment:    Active Hospital Problems    Diagnosis Date Noted    MIKKI (acute kidney injury) ([facility] [N17.9] 09/19/2022     Priority: Medium    Hydronephrosis with renal calculous obstruction [N13.2] 09/19/2022     Priority: Medium    HLD (hyperlipidemia) [E78.5] 09/19/2022     Priority: Medium    Diabetes mellitus ([facility] [E11.9] 09/19/2022     Priority: Medium       Plan:  SSI    PRAVACHOL  HOLD ARB      DVT Prophylaxis: SCD  Diet: ADULT DIET;  Regular  Code Status: Full Code    PT/OT Eval Status: NA    Dispo - HOME      Electronically signed by Kayla Boston DO on 9/21/2022 at 1:50 PM Howie Gifford

## 2022-09-21 NOTE — OP NOTE
[address] [address]                                OPERATIVE REPORT    PATIENT NAME: Sesar Gong                       :        1975  MED REC NO:   [id number]                            ROOM:  ACCOUNT NO:   [de-identified]                           ADMIT DATE: 2022  PROVIDER:     Arleen Brannon MD    DATE OF PROCEDURE:  2022    PREOPERATIVE DIAGNOSIS:  Right ureteral stone. POSTOPERATIVE DIAGNOSIS:  Right ureteral stone. PROCEDURES PERFORMED:  Cystopanendoscopy, retrograde pyelogram, and  stent placement. SURGEON:  Arleen Brannon M.D. ANESTHESIA:  LMAC. ESTIMATED BLOOD LOSS:  Less than 50. DESCRIPTION OF PROCEDURE:  With the patient in the lithotomy position  under satisfactory sedation, the genitalia were prepped and draped in a  sterile manner. A 21-[place]ican panendoscope passed easily through the  pendulous and membranous urethra. There were no strictures or lesions  seen. The prostatic fossa showed early trilobar growth. Upon entering  the bladder, the bladder was 1 to 2+ trabeculated. The mucosal pattern  was normal.  The trigone was symmetrical.  The ureteral orifices with  normal configuration and location. An open-ended catheter was passed  into the right ureteral orifice. Contrast was instilled. The ureter  was obstructed. I could not get dye beyond the point in the area of the  sacrum. I was able to manipulate a wire beyond this and then was able  to pass an open-ended catheter. There was a large amount of brownish  urine, which appeared to be give me the impression of a long-term  obstruction. At this point, I elected to place a stent, a 28-cm  6-[place] double-J stent was placed; one end in the pelvis and the other  in the bladder. The bladder was drained and the patient was sent to the  recovery room in satisfactory condition.   The patient will undergo  ureteroscopy and laser lithotripsy at some point in the future once his  serum creatinine has improved.         Guera De Jesus MD    D: 09/20/2022 14:23:29       T: 09/20/2022 14:27:00     [email or web address]  Job#: [id number]     Doc#: [id number]    CC:

## 2022-09-21 NOTE — PROGRESS NOTES
9/21/2022 3:46 PM  Jesus Grossman  [id number]    Subjective: Feeling much better today. No fevers, chills, nausea, vomiting. Having some minor hematuria and some discomfort from the stent. Review of Systems  Constitutional: No fever or chills   Respiratory: negative for cough and hemoptysis  Cardiovascular: negative for chest pain and dyspnea  Gastrointestinal: negative for abdominal pain, diarrhea, nausea and vomiting   : See above  Derm: negative for rash and skin lesion(s)  Neurological: negative for seizures and tremors  Musculoskeletal: Negative    Psychiatric: Negative   All other reviews are negative      Scheduled Meds:   amLODIPine  10 mg Oral Daily    metoprolol succinate  50 mg Oral Daily    [Held by provider] losartan  25 mg Oral Daily    sodium chloride flush  5-40 mL IntraVENous 2 times per day    insulin glargine  30 Units SubCUTAneous Nightly    pravastatin  20 mg Oral Nightly    insulin lispro  0-4 Units SubCUTAneous TID WC    insulin lispro  0-4 Units SubCUTAneous Nightly       Objective:  Vitals:    09/21/22 1245   BP: (!) 146/78   Pulse: 82   Resp:    Temp:    SpO2:          Allergies: Patient has no known allergies.     General Appearance: alert and oriented to person, place and time and in no acute distress  Skin: no rash or erythema  Head: normocephalic and atraumatic  Pulmonary/Chest: normal air movement, no respiratory distress  Abdomen: soft, non-tender, non-distended  Genitourinary: no catalan   Extremities: no cyanosis, clubbing or edema         Labs:     Recent Labs     09/21/22  0856      K 4.7      CO2 25   BUN 29*   CREATININE 2.6*   GLUCOSE 221*   CALCIUM 8.9       Lab Results   Component Value Date/Time    HGB 12.9 09/20/2022 06:30 AM    HCT 37.9 09/20/2022 06:30 AM       No results found for: PSA      Assessment/Plan:  POD#1 cystoscopy, retrograde pyelogram, right stent placement  6 mm right mid ureteral calculi    Creatinine trending down, 3.1>>2.6  Urine culture no growth to date  Antibiotics per primary  Continue the right ureteral stent  He will require definitive stone management as an outpatient via ureteroscopy with laser lithotripsy  There are no further acute interventions planned at this time  Please call with further questions or concerns    Gail Rivas, APRN - CNP   [facility]  Urology    I agree with the assessment and plan of Gail Rivas CNP-APTIA. I personally evaluated the patient and made any changes to reflect my impression and plan.

## 2022-09-21 NOTE — PROGRESS NOTES
BS 65 - patient states blood sugar  dropped during the night as well.  Juice and crackers supplied, will recheck BS

## 2022-09-21 NOTE — PLAN OF CARE
Problem: Pain  Goal: Verbalizes/displays adequate comfort level or baseline comfort level  Outcome: Progressing     Problem: Chronic Conditions and Co-morbidities  Goal: Patient's chronic conditions and co-morbidity symptoms are monitored and maintained or improved  Outcome: Progressing     Problem: Discharge Planning  Goal: Discharge to home or other facility with appropriate resources  Outcome: Progressing none

## 2022-09-22 LAB — URINE CULTURE, ROUTINE: NORMAL

## 2022-09-23 LAB — URINE CULTURE, ROUTINE: NORMAL

## 2022-09-30 ENCOUNTER — HOSPITAL ENCOUNTER (OUTPATIENT)
Age: 47
Discharge: HOME | End: 2022-10-02

## 2022-09-30 PROCEDURE — 82365 CALCULUS SPECTROSCOPY: CPT

## 2022-09-30 PROCEDURE — 88300 SURGICAL PATH GROSS: CPT

## 2022-10-06 LAB
CALCULI COMPOSITION: NORMAL
MASS: 3 MG
STONE DESCRIPTION: NORMAL

## 2023-04-22 ENCOUNTER — OFFICE VISIT (OUTPATIENT)
Dept: FAMILY MEDICINE CLINIC | Age: 48
End: 2023-04-22
Payer: COMMERCIAL

## 2023-04-22 VITALS
SYSTOLIC BLOOD PRESSURE: 110 MMHG | HEIGHT: 68 IN | WEIGHT: 179 LBS | HEART RATE: 94 BPM | OXYGEN SATURATION: 98 % | DIASTOLIC BLOOD PRESSURE: 70 MMHG | BODY MASS INDEX: 27.13 KG/M2 | TEMPERATURE: 97.4 F

## 2023-04-22 DIAGNOSIS — J06.9 VIRAL URI: ICD-10-CM

## 2023-04-22 DIAGNOSIS — H10.31 ACUTE BACTERIAL CONJUNCTIVITIS OF RIGHT EYE: Primary | ICD-10-CM

## 2023-04-22 PROCEDURE — 99203 OFFICE O/P NEW LOW 30 MIN: CPT | Performed by: FAMILY MEDICINE

## 2023-04-22 RX ORDER — TOBRAMYCIN 3 MG/ML
1 SOLUTION/ DROPS OPHTHALMIC EVERY 4 HOURS
Qty: 5 ML | Refills: 0 | Status: SHIPPED | OUTPATIENT
Start: 2023-04-22 | End: 2023-04-29

## 2023-04-22 RX ORDER — TOBRAMYCIN 3 MG/ML
1 SOLUTION/ DROPS OPHTHALMIC EVERY 4 HOURS
Qty: 5 ML | Refills: 0 | Status: SHIPPED
Start: 2023-04-22 | End: 2023-04-22

## 2023-04-22 ASSESSMENT — ENCOUNTER SYMPTOMS
EYE REDNESS: 1
ABDOMINAL PAIN: 0
EYE DISCHARGE: 1
BACK PAIN: 0
SINUS PAIN: 0
CONSTIPATION: 0
NAUSEA: 0
VOMITING: 0
SINUS PRESSURE: 0
SORE THROAT: 0
RHINORRHEA: 0
SHORTNESS OF BREATH: 0
EYE PAIN: 1
COUGH: 0
DIARRHEA: 0
WHEEZING: 0

## 2023-04-22 NOTE — PROGRESS NOTES
capsule Take 1 capsule by mouth daily for 5 days. 5 capsule 0    insulin glargine (LANTUS) 100 UNIT/ML injection vial Inject 15 Units into the skin every morning      glimepiride (AMARYL) 2 MG tablet Take 4 mg by mouth every morning (before breakfast)      [DISCONTINUED] metFORMIN (GLUCOPHAGE) 500 MG tablet Take 1,000 mg by mouth 2 times daily (with meals). (Patient not taking: Reported on 9/20/2022)      [DISCONTINUED] losartan (COZAAR) 25 MG tablet Take 25 mg by mouth daily. (Patient not taking: Reported on 9/20/2022)       No current facility-administered medications on file prior to visit. No Known Allergies    Past Medical History:   Diagnosis Date    Back pain     Diabetes mellitus ([facility]     Kidney stone      Past Surgical History:   Procedure Laterality Date    LITHOTRIPSY Right 9/20/2022    CYSTOSCOPY RETROGRADE PYELOGRAM URETEROSCOPY J STENT RIGHT performed by Jenise Bailey MD at [address] [place] [address]     History reviewed. No pertinent family history. Social History       Tobacco History       Smoking Status  Never      Smokeless Tobacco Use  Unknown                     Vitals:    04/22/23 0857   BP: 110/70   Pulse: 94   Temp: 97.4 °F (36.3 °C)   SpO2: 98%   Weight: 179 lb (81.2 kg)   Height: 5' 8\" (1.727 m)       Physical Exam:  Physical Exam  Vitals and nursing note reviewed. Constitutional:       General: He is not in acute distress. Appearance: Normal appearance. He is well-developed and normal weight. He is not ill-appearing. HENT:      Head: Normocephalic and atraumatic. Right Ear: Hearing, tympanic membrane, ear canal and external ear normal. There is no impacted cerumen. Left Ear: Hearing, tympanic membrane, ear canal and external ear normal. There is no impacted cerumen. Nose: Congestion present. No mucosal edema or rhinorrhea. Right Sinus: No maxillary sinus tenderness or frontal sinus tenderness.       Left Sinus: No maxillary sinus tenderness or frontal sinus

## 2023-11-30 ENCOUNTER — OFFICE VISIT (OUTPATIENT)
Dept: FAMILY MEDICINE CLINIC | Age: 48
End: 2023-11-30
Payer: COMMERCIAL

## 2023-11-30 VITALS
HEIGHT: 68 IN | OXYGEN SATURATION: 98 % | WEIGHT: 187 LBS | SYSTOLIC BLOOD PRESSURE: 124 MMHG | BODY MASS INDEX: 28.34 KG/M2 | HEART RATE: 87 BPM | TEMPERATURE: 98.4 F | DIASTOLIC BLOOD PRESSURE: 78 MMHG

## 2023-11-30 DIAGNOSIS — J32.9 SINOBRONCHITIS: Primary | ICD-10-CM

## 2023-11-30 DIAGNOSIS — H69.83 ETD (EUSTACHIAN TUBE DYSFUNCTION), BILATERAL: ICD-10-CM

## 2023-11-30 DIAGNOSIS — J40 SINOBRONCHITIS: Primary | ICD-10-CM

## 2023-11-30 DIAGNOSIS — J02.9 SORE THROAT: ICD-10-CM

## 2023-11-30 LAB
INFLUENZA A ANTIBODY: NEGATIVE
INFLUENZA B ANTIBODY: NEGATIVE

## 2023-11-30 PROCEDURE — 99213 OFFICE O/P EST LOW 20 MIN: CPT | Performed by: EMERGENCY MEDICINE

## 2023-11-30 PROCEDURE — 87804 INFLUENZA ASSAY W/OPTIC: CPT | Performed by: EMERGENCY MEDICINE

## 2023-11-30 RX ORDER — AMOXICILLIN AND CLAVULANATE POTASSIUM 875; 125 MG/1; MG/1
1 TABLET, FILM COATED ORAL 2 TIMES DAILY
Qty: 20 TABLET | Refills: 0 | Status: SHIPPED | OUTPATIENT
Start: 2023-11-30 | End: 2023-12-10

## 2023-11-30 RX ORDER — BENZONATATE 200 MG/1
200 CAPSULE ORAL 3 TIMES DAILY PRN
Qty: 30 CAPSULE | Refills: 0 | Status: SHIPPED | OUTPATIENT
Start: 2023-11-30 | End: 2023-12-07

## 2023-11-30 RX ORDER — GUAIFENESIN 600 MG/1
600 TABLET, EXTENDED RELEASE ORAL 2 TIMES DAILY
Qty: 30 TABLET | Refills: 0 | Status: SHIPPED | OUTPATIENT
Start: 2023-11-30 | End: 2023-12-15

## 2023-11-30 ASSESSMENT — ENCOUNTER SYMPTOMS
EYE PAIN: 0
RHINORRHEA: 1
ABDOMINAL PAIN: 0
BACK PAIN: 0
SORE THROAT: 1
COUGH: 1
NAUSEA: 0
DIARRHEA: 0
SHORTNESS OF BREATH: 0
VOMITING: 0
SINUS PRESSURE: 0
EYE REDNESS: 0
WHEEZING: 0
EYE DISCHARGE: 0

## 2023-11-30 NOTE — PROGRESS NOTES
Cardiovascular:      Rate and Rhythm: Normal rate and regular rhythm. Heart sounds: Normal heart sounds. No murmur heard. Pulmonary:      Effort: Pulmonary effort is normal. No respiratory distress. Breath sounds: Rhonchi present. No wheezing or rales. Abdominal:      General: Bowel sounds are normal.      Palpations: Abdomen is soft. Tenderness: There is no abdominal tenderness. There is no guarding or rebound. Musculoskeletal:      Cervical back: Normal range of motion and neck supple. Skin:     General: Skin is warm and dry. Neurological:      Mental Status: He is alert and oriented to person, place, and time. Cranial Nerves: No cranial nerve deficit. Coordination: Coordination normal.         Test Results Section   (All laboratory and radiology results have been personally reviewed by myself)  Labs:  Results for orders placed or performed in visit on 11/30/23   POCT Influenza A/B   Result Value Ref Range    Influenza A Ab negative     Influenza B Ab negative         Imaging: All Radiology results interpreted by Radiologist unless otherwise noted. No results found. Assessment / Plan   Impression(s):  Ebony Berger was seen today for uri, otalgia, pharyngitis and chills. Diagnoses and all orders for this visit:    Sinobronchitis  -     amoxicillin-clavulanate (AUGMENTIN) 875-125 MG per tablet; Take 1 tablet by mouth 2 times daily for 10 days  -     benzonatate (TESSALON) 200 MG capsule; Take 1 capsule by mouth 3 times daily as needed for Cough  -     guaiFENesin (MUCINEX) 600 MG extended release tablet; Take 1 tablet by mouth 2 times daily for 15 days    Sore throat    ETD (Eustachian tube dysfunction), bilateral    Other orders  -     POCT Influenza A/B         Discussed symptomatic treatments with the patient today. Return if symptoms worsen or fail to improve. Red flag symptoms were also discussed with the patient today.  If symptoms worsen the patient is to go directly

## 2023-12-04 LAB
25(OH)D3 SERPL-MCNC: 67.1 NG/ML (ref 30–100)
ALBUMIN SERPL-MCNC: 4.1 G/DL (ref 3.5–5.2)
ALP SERPL-CCNC: 57 U/L (ref 40–129)
ALT SERPL-CCNC: 21 U/L (ref 0–40)
ANION GAP SERPL CALCULATED.3IONS-SCNC: 15 MMOL/L (ref 7–16)
AST SERPL-CCNC: 26 U/L (ref 0–39)
BASOPHILS # BLD: 0.04 K/UL (ref 0–0.2)
BASOPHILS NFR BLD: 0 % (ref 0–2)
BILIRUB SERPL-MCNC: 0.3 MG/DL (ref 0–1.2)
BILIRUB UR QL STRIP: NEGATIVE
BUN SERPL-MCNC: 29 MG/DL (ref 6–20)
CALCIUM SERPL-MCNC: 10 MG/DL (ref 8.6–10.2)
CHLORIDE SERPL-SCNC: 100 MMOL/L (ref 98–107)
CLARITY UR: CLEAR
CO2 SERPL-SCNC: 21 MMOL/L (ref 22–29)
COLOR UR: YELLOW
CREAT SERPL-MCNC: 2 MG/DL (ref 0.7–1.2)
CREAT UR-MCNC: 21.5 MG/DL (ref 40–278)
EOSINOPHIL # BLD: 0.1 K/UL (ref 0.05–0.5)
EOSINOPHILS RELATIVE PERCENT: 1 % (ref 0–6)
ERYTHROCYTE [DISTWIDTH] IN BLOOD BY AUTOMATED COUNT: 12.2 % (ref 11.5–15)
FERRITIN SERPL-MCNC: 235 NG/ML
GFR SERPL CREATININE-BSD FRML MDRD: 41 ML/MIN/1.73M2
GLUCOSE SERPL-MCNC: 126 MG/DL (ref 74–99)
GLUCOSE UR STRIP-MCNC: >=1000 MG/DL
HCT VFR BLD AUTO: 42.1 % (ref 37–54)
HGB BLD-MCNC: 14.4 G/DL (ref 12.5–16.5)
HGB UR QL STRIP.AUTO: NEGATIVE
IMM GRANULOCYTES # BLD AUTO: 0.04 K/UL (ref 0–0.58)
IMM GRANULOCYTES NFR BLD: 0 % (ref 0–5)
IRON SATN MFR SERPL: 16 % (ref 20–55)
IRON SERPL-MCNC: 40 UG/DL (ref 59–158)
KETONES UR STRIP-MCNC: NEGATIVE MG/DL
LEUKOCYTE ESTERASE UR QL STRIP: NEGATIVE
LYMPHOCYTES NFR BLD: 2.88 K/UL (ref 1.5–4)
LYMPHOCYTES RELATIVE PERCENT: 24 % (ref 20–42)
MAGNESIUM SERPL-MCNC: 2.4 MG/DL (ref 1.6–2.6)
MCH RBC QN AUTO: 31.5 PG (ref 26–35)
MCHC RBC AUTO-ENTMCNC: 34.2 G/DL (ref 32–34.5)
MCV RBC AUTO: 92.1 FL (ref 80–99.9)
MONOCYTES NFR BLD: 0.67 K/UL (ref 0.1–0.95)
MONOCYTES NFR BLD: 6 % (ref 2–12)
NEUTROPHILS NFR BLD: 69 % (ref 43–80)
NEUTS SEG NFR BLD: 8.19 K/UL (ref 1.8–7.3)
NITRITE UR QL STRIP: NEGATIVE
PH UR STRIP: 7 [PH] (ref 5–9)
PHOSPHATE SERPL-MCNC: 2.9 MG/DL (ref 2.5–4.5)
PLATELET # BLD AUTO: 277 K/UL (ref 130–450)
PMV BLD AUTO: 9.8 FL (ref 7–12)
POTASSIUM SERPL-SCNC: 4.8 MMOL/L (ref 3.5–5)
PROT SERPL-MCNC: 8.1 G/DL (ref 6.4–8.3)
PROT UR STRIP-MCNC: NEGATIVE MG/DL
PTH-INTACT SERPL-MCNC: 40.2 PG/ML (ref 15–65)
RBC # BLD AUTO: 4.57 M/UL (ref 3.8–5.8)
RBC #/AREA URNS HPF: ABNORMAL /HPF
SODIUM SERPL-SCNC: 136 MMOL/L (ref 132–146)
SP GR UR STRIP: <1.005 (ref 1–1.03)
TIBC SERPL-MCNC: 255 UG/DL (ref 250–450)
TOTAL PROTEIN, URINE: 7 MG/DL (ref 0–12)
URATE SERPL-MCNC: 5.7 MG/DL (ref 3.4–7)
URINE TOTAL PROTEIN CREATININE RATIO: 0.34 (ref 0–0.2)
UROBILINOGEN UR STRIP-ACNC: 0.2 EU/DL (ref 0–1)
WBC #/AREA URNS HPF: ABNORMAL /HPF
WBC OTHER # BLD: 11.9 K/UL (ref 4.5–11.5)

## 2024-02-27 LAB
ALBUMIN SERPL-MCNC: 4.1 G/DL (ref 3.5–5.2)
ALP SERPL-CCNC: 38 U/L (ref 40–129)
ALT SERPL-CCNC: 19 U/L (ref 0–40)
ANION GAP SERPL CALCULATED.3IONS-SCNC: 14 MMOL/L (ref 7–16)
AST SERPL-CCNC: 25 U/L (ref 0–39)
BASOPHILS # BLD: 0.03 K/UL (ref 0–0.2)
BASOPHILS NFR BLD: 1 % (ref 0–2)
BILIRUB SERPL-MCNC: 0.4 MG/DL (ref 0–1.2)
BUN SERPL-MCNC: 38 MG/DL (ref 6–20)
CALCIUM SERPL-MCNC: 9.6 MG/DL (ref 8.6–10.2)
CHLORIDE SERPL-SCNC: 104 MMOL/L (ref 98–107)
CO2 SERPL-SCNC: 24 MMOL/L (ref 22–29)
CREAT SERPL-MCNC: 1.8 MG/DL (ref 0.7–1.2)
EOSINOPHIL # BLD: 0.08 K/UL (ref 0.05–0.5)
EOSINOPHILS RELATIVE PERCENT: 1 % (ref 0–6)
ERYTHROCYTE [DISTWIDTH] IN BLOOD BY AUTOMATED COUNT: 13.6 % (ref 11.5–15)
GFR SERPL CREATININE-BSD FRML MDRD: 47 ML/MIN/1.73M2
GLUCOSE SERPL-MCNC: 80 MG/DL (ref 74–99)
HBA1C MFR BLD: 6.1 % (ref 4–5.6)
HCT VFR BLD AUTO: 44.2 % (ref 37–54)
HGB BLD-MCNC: 14.9 G/DL (ref 12.5–16.5)
IMM GRANULOCYTES # BLD AUTO: <0.03 K/UL (ref 0–0.58)
IMM GRANULOCYTES NFR BLD: 0 % (ref 0–5)
LYMPHOCYTES NFR BLD: 2.1 K/UL (ref 1.5–4)
LYMPHOCYTES RELATIVE PERCENT: 35 % (ref 20–42)
MCH RBC QN AUTO: 31.4 PG (ref 26–35)
MCHC RBC AUTO-ENTMCNC: 33.7 G/DL (ref 32–34.5)
MCV RBC AUTO: 93.2 FL (ref 80–99.9)
MONOCYTES NFR BLD: 0.51 K/UL (ref 0.1–0.95)
MONOCYTES NFR BLD: 8 % (ref 2–12)
NEUTROPHILS NFR BLD: 55 % (ref 43–80)
NEUTS SEG NFR BLD: 3.34 K/UL (ref 1.8–7.3)
PLATELET # BLD AUTO: 185 K/UL (ref 130–450)
PMV BLD AUTO: 10.1 FL (ref 7–12)
POTASSIUM SERPL-SCNC: 4.6 MMOL/L (ref 3.5–5)
PROT SERPL-MCNC: 6.8 G/DL (ref 6.4–8.3)
RBC # BLD AUTO: 4.74 M/UL (ref 3.8–5.8)
SODIUM SERPL-SCNC: 142 MMOL/L (ref 132–146)
TESTOST SERPL-MCNC: 326 NG/DL (ref 249–836)
WBC OTHER # BLD: 6.1 K/UL (ref 4.5–11.5)

## 2024-02-28 LAB
SHBG SERPL-SCNC: 35 NMOL/L (ref 11–80)
TESTOST FREE MFR SERPL: 59.1 PG/ML (ref 47–244)
TESTOST SERPL-MCNC: 310 NG/DL (ref 220–1000)

## 2024-03-01 LAB — PSA SERPL DL<=0.01 NG/ML-MCNC: 3.56 NG/ML (ref 0–4)

## 2024-06-10 ENCOUNTER — OFFICE VISIT (OUTPATIENT)
Dept: FAMILY MEDICINE CLINIC | Age: 49
End: 2024-06-10
Payer: COMMERCIAL

## 2024-06-10 VITALS
HEART RATE: 65 BPM | OXYGEN SATURATION: 98 % | BODY MASS INDEX: 27.07 KG/M2 | DIASTOLIC BLOOD PRESSURE: 76 MMHG | SYSTOLIC BLOOD PRESSURE: 124 MMHG | WEIGHT: 178 LBS | TEMPERATURE: 97.8 F

## 2024-06-10 DIAGNOSIS — H66.91 RIGHT OTITIS MEDIA, UNSPECIFIED OTITIS MEDIA TYPE: ICD-10-CM

## 2024-06-10 DIAGNOSIS — H61.23 BILATERAL IMPACTED CERUMEN: Primary | ICD-10-CM

## 2024-06-10 PROCEDURE — 69210 REMOVE IMPACTED EAR WAX UNI: CPT | Performed by: PHYSICIAN ASSISTANT

## 2024-06-10 PROCEDURE — 99214 OFFICE O/P EST MOD 30 MIN: CPT | Performed by: PHYSICIAN ASSISTANT

## 2024-06-10 RX ORDER — AMOXICILLIN 500 MG/1
500 CAPSULE ORAL 2 TIMES DAILY
Qty: 20 CAPSULE | Refills: 0 | Status: SHIPPED | OUTPATIENT
Start: 2024-06-10 | End: 2024-06-20

## 2024-06-10 RX ORDER — CIPROFLOXACIN AND DEXAMETHASONE 3; 1 MG/ML; MG/ML
4 SUSPENSION/ DROPS AURICULAR (OTIC) 2 TIMES DAILY
Qty: 7.5 ML | Refills: 0 | Status: SHIPPED | OUTPATIENT
Start: 2024-06-10 | End: 2024-06-17

## 2024-06-10 NOTE — PROGRESS NOTES
6/10/24  Yuriy Bain : 1975 Sex: male  Age 48 y.o.      Subjective:  Chief Complaint   Patient presents with    Otalgia     Right ear         HPI:   HPI  Yuriy Bain , 48 y.o. male presents to express care for evaluation of right ear pain.  The patient states that the symptoms are about 3 to 4 weeks ago.  The patient had some drainage and discharge out of the right ear.  Thought that it may be infected.  The patient is not really having any left ear discomfort.  Patient without any sinus congestion, drainage.  The patient may have got some water in his ears.  The patient does wear earplugs.  The patient is a .      ROS:   Unless otherwise stated in this report the patient's positive and negative responses for review of systems for constitutional, eyes, ENT, cardiovascular, respiratory, gastrointestinal, neurological, , musculoskeletal, and integument systems and related systems to the presenting problem are either stated in the history of present illness or were not pertinent or were negative for the symptoms and/or complaints related to the presenting medical problem.  Positives and pertinent negatives as per HPI.  All others reviewed and are negative.      PMH:     Past Medical History:   Diagnosis Date    Back pain     Diabetes mellitus (HCC)     Kidney stone        Past Surgical History:   Procedure Laterality Date    LITHOTRIPSY Right 2022    CYSTOSCOPY RETROGRADE PYELOGRAM URETEROSCOPY J STENT RIGHT performed by Pierce Segura MD at [facility] OR       History reviewed. No pertinent family history.    Medications:     Current Outpatient Medications:     amoxicillin (AMOXIL) 500 MG capsule, Take 1 capsule by mouth 2 times daily for 10 days, Disp: 20 capsule, Rfl: 0    ciprofloxacin-dexAMETHasone (CIPRODEX) 0.3-0.1 % otic suspension, Place 4 drops into both ears 2 times daily for 7 days, Disp: 7.5 mL, Rfl: 0    sodium bicarbonate 650 MG tablet, Take 650 mg by mouth 2 times

## 2024-07-20 ENCOUNTER — OFFICE VISIT (OUTPATIENT)
Dept: FAMILY MEDICINE CLINIC | Age: 49
End: 2024-07-20
Payer: COMMERCIAL

## 2024-07-20 VITALS
HEART RATE: 100 BPM | DIASTOLIC BLOOD PRESSURE: 62 MMHG | BODY MASS INDEX: 26.98 KG/M2 | HEIGHT: 68 IN | WEIGHT: 178 LBS | TEMPERATURE: 98.8 F | SYSTOLIC BLOOD PRESSURE: 110 MMHG | OXYGEN SATURATION: 98 %

## 2024-07-20 DIAGNOSIS — J30.1 SEASONAL ALLERGIC RHINITIS DUE TO POLLEN: Primary | ICD-10-CM

## 2024-07-20 PROCEDURE — 99212 OFFICE O/P EST SF 10 MIN: CPT | Performed by: FAMILY MEDICINE

## 2024-07-20 ASSESSMENT — ENCOUNTER SYMPTOMS
BACK PAIN: 0
WHEEZING: 0
COUGH: 1
VOMITING: 0
SORE THROAT: 0
SINUS PAIN: 0
EYE PAIN: 0
RHINORRHEA: 1
DIARRHEA: 0
ABDOMINAL PAIN: 0
SHORTNESS OF BREATH: 0
NAUSEA: 0
CONSTIPATION: 0

## 2024-07-20 NOTE — PROGRESS NOTES
Yuriy Bain (:  1975) is a 49 y.o. male,Established patient, here for evaluation of the following chief complaint(s):  Congestion      Assessment & Plan   1. Seasonal allergic rhinitis due to pollen  Comments:  continue zytec in am, add benedryl in pm, cont flose daily  add mucinex DM  f/u in a week if not getting better      Return if symptoms worsen or fail to improve.       Subjective   Congestion sneezing and runny nose for 2 days  Taking zyrtec daily and flonase nightly  No fevers  Coughing up some mucous    Has really bad allergies but his wife was in and got steroids and zpack        Review of Systems   Constitutional:  Negative for appetite change, fatigue and fever.   HENT:  Positive for congestion, postnasal drip, rhinorrhea and sneezing. Negative for ear pain, hearing loss, sinus pain and sore throat.    Eyes:  Negative for pain.   Respiratory:  Positive for cough. Negative for shortness of breath and wheezing.    Cardiovascular:  Negative for chest pain and leg swelling.   Gastrointestinal:  Negative for abdominal pain, constipation, diarrhea, nausea and vomiting.   Endocrine: Negative for cold intolerance and heat intolerance.   Genitourinary:  Negative for difficulty urinating, hematuria, scrotal swelling, testicular pain and urgency.   Musculoskeletal:  Negative for arthralgias, back pain, joint swelling and myalgias.   Skin:  Negative for rash and wound.   Neurological:  Negative for dizziness, syncope and light-headedness.   Hematological:  Negative for adenopathy.   Psychiatric/Behavioral:  Negative for confusion and sleep disturbance. The patient is not nervous/anxious.           Objective   Physical Exam  Vitals and nursing note reviewed.   Constitutional:       General: He is not in acute distress.     Appearance: He is well-developed. He is not ill-appearing or toxic-appearing.   HENT:      Head: Normocephalic and atraumatic.      Right Ear: Tympanic membrane normal.      Left Ear:

## 2024-12-27 LAB
ALBUMIN SERPL-MCNC: 4.3 G/DL (ref 3.5–5.2)
ALP SERPL-CCNC: 57 U/L (ref 40–129)
ALT SERPL-CCNC: 25 U/L (ref 0–40)
ANION GAP SERPL CALCULATED.3IONS-SCNC: 11 MMOL/L (ref 7–16)
AST SERPL-CCNC: 30 U/L (ref 0–39)
BASOPHILS # BLD: 0.03 K/UL (ref 0–0.2)
BASOPHILS NFR BLD: 0 % (ref 0–2)
BILIRUB SERPL-MCNC: 0.4 MG/DL (ref 0–1.2)
BUN SERPL-MCNC: 54 MG/DL (ref 6–20)
CALCIUM SERPL-MCNC: 9.6 MG/DL (ref 8.6–10.2)
CHLORIDE SERPL-SCNC: 103 MMOL/L (ref 98–107)
CO2 SERPL-SCNC: 26 MMOL/L (ref 22–29)
CREAT SERPL-MCNC: 2.3 MG/DL (ref 0.7–1.2)
EOSINOPHIL # BLD: 0.17 K/UL (ref 0.05–0.5)
EOSINOPHILS RELATIVE PERCENT: 2 % (ref 0–6)
ERYTHROCYTE [DISTWIDTH] IN BLOOD BY AUTOMATED COUNT: 12.9 % (ref 11.5–15)
GFR, ESTIMATED: 34 ML/MIN/1.73M2
GLUCOSE SERPL-MCNC: 79 MG/DL (ref 74–99)
HBA1C MFR BLD: 5.9 % (ref 4–5.6)
HCT VFR BLD AUTO: 43.3 % (ref 37–54)
HGB BLD-MCNC: 14.5 G/DL (ref 12.5–16.5)
IMM GRANULOCYTES # BLD AUTO: <0.03 K/UL (ref 0–0.58)
IMM GRANULOCYTES NFR BLD: 0 % (ref 0–5)
LYMPHOCYTES NFR BLD: 3.25 K/UL (ref 1.5–4)
LYMPHOCYTES RELATIVE PERCENT: 40 % (ref 20–42)
MCH RBC QN AUTO: 30.9 PG (ref 26–35)
MCHC RBC AUTO-ENTMCNC: 33.5 G/DL (ref 32–34.5)
MCV RBC AUTO: 92.3 FL (ref 80–99.9)
MONOCYTES NFR BLD: 0.53 K/UL (ref 0.1–0.95)
MONOCYTES NFR BLD: 7 % (ref 2–12)
NEUTROPHILS NFR BLD: 51 % (ref 43–80)
NEUTS SEG NFR BLD: 4.13 K/UL (ref 1.8–7.3)
PLATELET # BLD AUTO: 179 K/UL (ref 130–450)
PMV BLD AUTO: 10 FL (ref 7–12)
POTASSIUM SERPL-SCNC: 4.7 MMOL/L (ref 3.5–5)
PROT SERPL-MCNC: 7.1 G/DL (ref 6.4–8.3)
RBC # BLD AUTO: 4.69 M/UL (ref 3.8–5.8)
SODIUM SERPL-SCNC: 140 MMOL/L (ref 132–146)
WBC OTHER # BLD: 8.1 K/UL (ref 4.5–11.5)

## 2024-12-28 LAB
SHBG SERPL-SCNC: 37 NMOL/L (ref 17–56)
TESTOST FREE MFR SERPL: 67.8 PG/ML (ref 47–244)
TESTOST SERPL-MCNC: 362 NG/DL (ref 249–836)

## 2025-01-22 ENCOUNTER — HOSPITAL ENCOUNTER (OUTPATIENT)
Dept: ULTRASOUND IMAGING | Age: 50
Discharge: HOME | End: 2025-01-24
Payer: COMMERCIAL

## 2025-01-22 DIAGNOSIS — R94.4 NONSPECIFIC ABNORMAL RESULTS OF KIDNEY FUNCTION STUDY: ICD-10-CM

## 2025-01-22 PROCEDURE — 93975 VASCULAR STUDY: CPT

## 2025-01-22 PROCEDURE — 76770 US EXAM ABDO BACK WALL COMP: CPT

## 2025-02-13 ENCOUNTER — OFFICE VISIT (OUTPATIENT)
Dept: FAMILY MEDICINE CLINIC | Age: 50
End: 2025-02-13

## 2025-02-13 VITALS
DIASTOLIC BLOOD PRESSURE: 60 MMHG | BODY MASS INDEX: 27.07 KG/M2 | HEART RATE: 84 BPM | WEIGHT: 178 LBS | OXYGEN SATURATION: 99 % | TEMPERATURE: 98.5 F | SYSTOLIC BLOOD PRESSURE: 120 MMHG

## 2025-02-13 DIAGNOSIS — J01.90 ACUTE SINUSITIS, RECURRENCE NOT SPECIFIED, UNSPECIFIED LOCATION: ICD-10-CM

## 2025-02-13 DIAGNOSIS — R68.83 CHILLS: ICD-10-CM

## 2025-02-13 DIAGNOSIS — J02.9 SORE THROAT: ICD-10-CM

## 2025-02-13 DIAGNOSIS — R09.81 HEAD CONGESTION: ICD-10-CM

## 2025-02-13 LAB
INFLUENZA A ANTIBODY: NEGATIVE
INFLUENZA B ANTIBODY: NEGATIVE
S PYO AG THROAT QL: NORMAL

## 2025-02-13 RX ORDER — PREDNISONE 10 MG/1
TABLET ORAL
Qty: 12 TABLET | Refills: 0 | Status: SHIPPED | OUTPATIENT
Start: 2025-02-13 | End: 2025-02-18

## 2025-02-13 RX ORDER — DAPAGLIFLOZIN 10 MG/1
TABLET, FILM COATED ORAL
COMMUNITY
Start: 2025-01-21

## 2025-02-13 RX ORDER — RAMIPRIL 5 MG/1
CAPSULE ORAL DAILY
COMMUNITY
Start: 2025-01-14

## 2025-02-13 RX ORDER — DOXYCYCLINE HYCLATE 100 MG
100 TABLET ORAL 2 TIMES DAILY
Qty: 20 TABLET | Refills: 0 | Status: SHIPPED | OUTPATIENT
Start: 2025-02-13 | End: 2025-02-23

## 2025-02-13 ASSESSMENT — ENCOUNTER SYMPTOMS
NAUSEA: 0
SINUS PRESSURE: 0
DIARRHEA: 0
WHEEZING: 0
VOMITING: 0
SORE THROAT: 1
SHORTNESS OF BREATH: 0
COUGH: 0
ABDOMINAL PAIN: 0
CHEST TIGHTNESS: 0
EYES NEGATIVE: 1

## 2025-02-14 NOTE — PROGRESS NOTES
[facility]     [facility]  Yuriy Bain : 1975 Sex: male  Age: 49 y.o.    Chief Complaint   Patient presents with    Fever    Chills    Congestion       HPI  Pleasant gentleman presents to express care today complaining of fever/chills, headache, sore throat, head congestion along with green mucus production started suddenly 3 days ago.          Review of Systems   Constitutional:  Positive for chills and fever.   HENT:  Positive for congestion, postnasal drip and sore throat. Negative for ear pain and sinus pressure.    Eyes: Negative.    Respiratory:  Negative for cough, chest tightness, shortness of breath and wheezing.    Cardiovascular:  Negative for chest pain.   Gastrointestinal:  Negative for abdominal pain, diarrhea, nausea and vomiting.   Endocrine:        Type II diabetic   Genitourinary:         Chronic kidney disease   Musculoskeletal:  Negative for myalgias.   Neurological:  Negative for headaches.               REST OF PERTINENT ROS GONE OVER AND WAS NEGATIVE.                 Current Outpatient Medications:     ramipril (ALTACE) 5 MG capsule, Take by mouth daily, Disp: , Rfl:     FARXIGA 10 MG tablet, , Disp: , Rfl:     doxycycline hyclate (VIBRA-TABS) 100 MG tablet, Take 1 tablet by mouth 2 times daily for 10 days, Disp: 20 tablet, Rfl: 0    predniSONE (DELTASONE) 10 MG tablet, Take 3 tablets by mouth daily for 2 days, THEN 2 tablets daily for 2 days, THEN 1 tablet daily for 2 days., Disp: 12 tablet, Rfl: 0    metoprolol succinate (TOPROL XL) 50 MG extended release tablet, Take 1 tablet by mouth daily, Disp: , Rfl:     rosuvastatin (CRESTOR) 10 MG tablet, Take 1 tablet by mouth daily, Disp: , Rfl:     insulin glargine (LANTUS) 100 UNIT/ML injection vial, Inject 15 Units into the skin every morning, Disp: , Rfl:     glimepiride (AMARYL) 2 MG tablet, Take 2 tablets by mouth every morning (before breakfast), Disp: , Rfl:     sodium bicarbonate 650 MG tablet, Take 650 mg by mouth 2

## (undated) DEVICE — GOWN,SIRUS,FABRNF,XL,20/CS: Brand: MEDLINE

## (undated) DEVICE — 4-PORT MANIFOLD: Brand: NEPTUNE 2

## (undated) DEVICE — CATHETER URET 5FR L70CM OPN END SGL LUMN INJ HUB FLEXIMA

## (undated) DEVICE — SOLUTION IV IRRIG WATER 1000ML POUR BRL 2F7114

## (undated) DEVICE — CYSTO PACK: Brand: MEDLINE INDUSTRIES, INC.

## (undated) DEVICE — GLOVE ORANGE PI 7 1/2   MSG9075

## (undated) DEVICE — GAUZE,SPONGE,4"X4",8PLY,STRL,LF,10/TRAY: Brand: MEDLINE

## (undated) DEVICE — BAG DRNGE COMB PK

## (undated) DEVICE — SOLUTION IRRIG 3000ML 0.9% SOD CHL USP UROMATIC PLAS CONT

## (undated) DEVICE — SOLUTION IRRIG 3000ML STRL H2O USP UROMATIC PLAS CONT

## (undated) DEVICE — SOLUTION SURG PREP ANTIMICROBIAL 4 OZ SKIN WND EXIDINE

## (undated) DEVICE — GUIDEWIRE URO L150CM DIA0.035IN TAPR 8CM STR TIP STD SHFT

## (undated) DEVICE — SPECIMEN CUP W/LID: Brand: DEROYAL